# Patient Record
Sex: MALE
[De-identification: names, ages, dates, MRNs, and addresses within clinical notes are randomized per-mention and may not be internally consistent; named-entity substitution may affect disease eponyms.]

---

## 2018-07-05 LAB
ALBUMIN SERPL-MCNC: 3.8 G/DL (ref 3.5–5)
ALBUMIN/GLOB SERPL: 1.1 {RATIO} (ref 0.8–2)
ALP SERPL-CCNC: 56 IU/L (ref 40–150)
ALT SERPL-CCNC: 52 IU/L (ref 0–55)
ANION GAP SERPL CALC-SCNC: 9.6 MMOL/L (ref 8–16)
BASOPHILS # BLD AUTO: 0 10*3/UL (ref 0–0.1)
BASOPHILS NFR BLD AUTO: 0.5 % (ref 0–1)
BUN SERPL-MCNC: 12 MG/DL (ref 7–26)
BUN/CREAT SERPL: 15 (ref 6–25)
CALCIUM SERPL-MCNC: 9.7 MG/DL (ref 8.4–10.2)
CHLORIDE SERPL-SCNC: 105 MMOL/L (ref 98–107)
CO2 SERPL-SCNC: 31 MMOL/L (ref 22–29)
DEPRECATED APTT PLAS QN: 48.5 SECONDS (ref 23.8–35.5)
DEPRECATED INR PLAS: 2.27
DEPRECATED NEUTROPHILS # BLD AUTO: 3.4 10*3/UL (ref 2.1–6.9)
EGFRCR SERPLBLD CKD-EPI 2021: > 60 ML/MIN (ref 60–?)
EOSINOPHIL # BLD AUTO: 0 10*3/UL (ref 0–0.4)
EOSINOPHIL NFR BLD AUTO: 0.7 % (ref 0–6)
ERYTHROCYTE [DISTWIDTH] IN CORD BLOOD: 13.6 % (ref 11.7–14.4)
GLOBULIN PLAS-MCNC: 3.4 G/DL (ref 2.3–3.5)
GLUCOSE SERPLBLD-MCNC: 111 MG/DL (ref 74–118)
HCT VFR BLD AUTO: 44.6 % (ref 38.2–49.6)
HGB BLD-MCNC: 14.6 G/DL (ref 14–18)
LYMPHOCYTES # BLD: 1.5 10*3/UL (ref 1–3.2)
LYMPHOCYTES NFR BLD AUTO: 26.3 % (ref 18–39.1)
MCH RBC QN AUTO: 29.3 PG (ref 28–32)
MCHC RBC AUTO-ENTMCNC: 32.7 G/DL (ref 31–35)
MCV RBC AUTO: 89.6 FL (ref 81–99)
MONOCYTES # BLD AUTO: 0.8 10*3/UL (ref 0.2–0.8)
MONOCYTES NFR BLD AUTO: 13 % (ref 4.4–11.3)
NEUTS SEG NFR BLD AUTO: 59.3 % (ref 38.7–80)
PLATELET # BLD AUTO: 180 X10E3/UL (ref 140–360)
POTASSIUM SERPL-SCNC: 4.6 MMOL/L (ref 3.5–5.1)
PROTHROMBIN TIME: 23.5 SECONDS (ref 11.9–14.5)
RBC # BLD AUTO: 4.98 X10E6/UL (ref 4.3–5.7)
SODIUM SERPL-SCNC: 141 MMOL/L (ref 136–145)

## 2018-10-03 NOTE — XMS REPORT
Clinical Summary

 Created on: 10/03/2018



Kurt Lindquist

External Reference #: [id number]

: 1940

Sex: Male



Demographics







 Address  [address] [place]  [address]

 

 Home Phone  [phone]

 

 Preferred Language  Unknown

 

 Marital Status  

 

 Pent[employer] Affiliation  [id number]

 

 Race  White

 

 Ethnic Group  /Latin





Author







 Author  [facility]

 

 Organization  [facility]

 

 Address  Unknown

 

 Phone  Unavailable







Support







 Name  Relationship  Address  Phone

 

 Daisy Livingston  [employer]  Unknown  [phone]







Care Team Providers







 Care Team Member Name  Role  Phone

 

 Cl Shankar MD  PCP  [phone]







Allergies

No Known Allergies



Current Medications







      



  Prescription   Sig.   Disp.   Refills   Start   End Date   Status



      Date  

 

      



  metFORMIN (GLUCOPHAGE)   Take 500 mg by mouth 2       Active



  500 mg tablet   (two) times a day with     



   meals.     

 

      



  carvedilol (COREG) 12.5   Take 12.5 mg by mouth 2       Active



  MG tablet   (two) times a day with     



   meals.     

 

      



  gabapentin (NEURONTIN)   Take 300 mg by mouth 3       Active



  300 mg capsule   (three) times a day.     

 

      



  famotidine (PEPCID) 40 MG   Take 40 mg by mouth       Active



  tablet   daily.     

 

      



  aspirin (ECOTRIN) 81 MG   Take 81 mg by mouth       Active



  enteric coated tablet   daily.     

 

      



  simvastatin (ZOCOR) 40 MG   Take 40 mg by mouth       Active



  tablet   nightly.     

 

      



  rivaroxaban (XARELTO) 15   Take 15 mg by mouth.       Active



  mg tablet      

 

      



  pantoprazole (PROTONIX)   Take 40 mg by mouth       Active



  40 MG EC tablet   daily.     

 

      



  sucralfate (CARAFATE) 1   Take 1 g by mouth 4       Active



  gram tablet   (four) times a day.     

 

      



  traMADol (ULTRAM) 50 mg   Take 50 mg by mouth every       Active



  tablet   6 (six) hours as needed     



   for moderate pain.     

 

      



  digOXIN (LANOXIN) 125 mcg   Take 125 mcg by mouth       Active



  tablet   daily.     

 

      



  meclizine (ANTIVERT) 12.5   Take 12.5 mg by mouth 3       Active



  mg tablet   (three) times a day as     



   needed for dizziness.     







Active Problems







 



  Problem   Noted Date

 

 



  Acute hepatitis   2017

 

 



  Hypertension   2017

 

 



  Mixed hyperlipidemia   2017

 

 



  A-fib (HCC)   2017







Encounters







    



  Date   Type   Specialty   Care Team   Description

 

    



  2017   Office Visit   Hepatology   Temo Chong MD   Acute 
hepatitis (Primary



     Aide Blakely, PAC   Dx);



      Hypertension, unspecified



      type;



      Mixed hyperlipidemia;



      Atrial fibrillation,



      unspecified type

 

    



  10/11/2017   Office Visit   Hepatology   Temo Chong MD   Abnormal 
liver enzymes



      (Primary Dx);



      Chronic hepatitis

 

    



  2017   Hospital   Radiology   Temo Chong MD   Abnormal liver 
enzymes



   Encounter   

 

    



  2017   Office Visit   Hepatology   Temo Chong MD   Abnormal 
liver enzymes



      (Primary Dx);



      Cirrhosis of liver



      without ascites,



      unspecified hepatic



      cirrhosis type;



      Mesenteric ischemia;



      Screening for cancer



after 2017



Social History







    



  Tobacco Use   Types   Packs/Day   Years Used   Date

 

    



  Never Assessed    









 



  Sex Assigned at Birth   Date Recorded

 

 



  Not on file 







Last Filed Vital Signs







  



  Vital Sign   Reading   Time Taken

 

  



  Blood Pressure   120/57   2017 11:30 AM CST

 

  



  Pulse   73   2017 11:30 AM CST

 

  



  Temperature   36.1   C (97   F)   10/11/2017 11:28 AM CDT

 

  



  Respiratory Rate   18   2017  2:30 PM CDT

 

  



  Oxygen Saturation   98%   2017 11:30 AM CST

 

  



  Inhaled Oxygen   -   -



  Concentration  

 

  



  Weight   67.6 kg (149 lb)   2017 11:30 AM CST

 

  



  Height   182.9 cm (6')   2017 11:30 AM CST

 

  



  Body Mass Index   20.21   2017 11:30 AM CST







Plan of Treatment







   



  Health Maintenance   Due Date   Last Done   Comments

 

   



  DIABETIC FOOT EXAM   1950  

 

   



  DIABETIC RETINAL EYE EXAM   1950  

 

   



  URINE MICROALBUMIN   1950  

 

   



  SHINGRIX VACCINE (#1)   1990  

 

   



  ZOSTER VACCINE   2000  

 

   



  PNEUMOCOCCAL   2005  



  POLYSACCHARIDE VACCINE   



  AGE 65 AND OVER   

 

   



  PNEUMOCOCCAL-13   2005  

 

   



  INFLUENZA VACCINE   2018  







Procedures







    



  Procedure Name   Priority   Date/Time   Associated Diagnosis   Comments

 

    



  CRYOGLOBULIN   Routine   2017   Acute hepatitis   Results for this



    12:02 PM CST   Hypertension, unspecified   procedure are in the



     type   results section.



     Mixed hyperlipidemia 



     Atrial fibrillation, 



     unspecified type 

 

    



  COMPREHENSIVE METABOLIC   Routine   2017   Acute hepatitis   Results 
for this



  PANEL    12:02 PM CST   Hypertension, unspecified   procedure are in the



     type   results section.



     Mixed hyperlipidemia 



     Atrial fibrillation, 



     unspecified type 

 

    



  CBC WITH PLATELET AND   Routine   2017   Acute hepatitis   Results for 
this



  DIFFERENTIAL    12:02 PM CST   Hypertension, unspecified   procedure are in 
the



     type   results section.



     Mixed hyperlipidemia 



     Atrial fibrillation, 



     unspecified type 

 

    



  PROTHROMBIN TIME WITH INR   Routine   10/11/2017   Abnormal liver enzymes   
Results for this



    12:08 PM CDT   Chronic hepatitis   procedure are in the



      results section.

 

    



  GGT   Routine   10/11/2017   Abnormal liver enzymes   Results for this



    12:08 PM CDT   Chronic hepatitis   procedure are in the



      results section.

 

    



  COMPREHENSIVE METABOLIC   Routine   10/11/2017   Abnormal liver enzymes   
Results for this



  PANEL    12:08 PM CDT   Chronic hepatitis   procedure are in the



      results section.

 

    



  ALPHA FETOPROTEIN   Routine   10/11/2017   Abnormal liver enzymes   Results 
for this



    12:08 PM CDT   Chronic hepatitis   procedure are in the



      results section.

 

    



  CBC WITH PLATELET AND   Routine   10/11/2017   Abnormal liver enzymes   
Results for this



  DIFFERENTIAL    12:08 PM CDT   Chronic hepatitis   procedure are in the



      results section.

 

    



  AMMONIA LEVEL   Routine   10/11/2017   Abnormal liver enzymes   Results for 
this



    12:08 PM CDT   Chronic hepatitis   procedure are in the



      results section.

 

    



  POC GLUCOSE   Routine   2017    Results for this



    1:27 PM CDT    procedure are in the



      results section.

 

    



  SURGICAL PATHOLOGY   Routine   2017    Results for this



  REQUEST    1:03 PM CDT    procedure are in the



      results section.

 

    



  IR TRANSJUGULAR LIVER   Routine   2017   Abnormal liver enzymes   
Results for this



  BIOPSY    11:59 AM CDT    procedure are in the



      results section.

 

    



  POC GLUCOSE   Routine   2017    Results for this



    10:31 AM CDT    procedure are in the



      results section.

 

    



  MITOCHONDRIAL ANTIBODY   Routine   2017   Abnormal liver enzymes   
Results for this



  W/REFL TITER    12:23 PM CDT   Cirrhosis of liver   procedure are in the



     without ascites,   results section.



     unspecified hepatic 



     cirrhosis type 



     Mesenteric ischemia 



     Screening for cancer 

 

    



  SATISH SCREEN W IFA W REFLEX   Routine   2017   Abnormal liver enzymes   
Results for this



  TO TITER    12:23 PM CDT   Cirrhosis of liver   procedure are in the



     without ascites,   results section.



     unspecified hepatic 



     cirrhosis type 



     Mesenteric ischemia 



     Screening for cancer 

 

    



  F-ACTIN (SMOOTH MUSCLE)   Routine   2017   Abnormal liver enzymes   
Results for this



  ANTIBODY, IGG    12:23 PM CDT   Cirrhosis of liver   procedure are in the



     without ascites,   results section.



     unspecified hepatic 



     cirrhosis type 



     Mesenteric ischemia 



     Screening for cancer 

 

    



  THYROID STIMULATING   Routine   2017   Abnormal liver enzymes   Results 
for this



  HORMONE    12:23 PM CDT   Cirrhosis of liver   procedure are in the



     without ascites,   results section.



     unspecified hepatic 



     cirrhosis type 



     Mesenteric ischemia 



     Screening for cancer 

 

    



  RPR TITER WITH REFLEX TO   Routine   2017   Abnormal liver enzymes   
Results for this



  CONFIRMATION    12:23 PM CDT   Cirrhosis of liver   procedure are in the



     without ascites,   results section.



     unspecified hepatic 



     cirrhosis type 



     Mesenteric ischemia 



     Screening for cancer 

 

    



  HEPATITIS E VIRUS AB, IGM   Routine   2017   Abnormal liver enzymes   
Results for this



  BY HEVER    12:23 PM CDT   Cirrhosis of liver   procedure are in the



     without ascites,   results section.



     unspecified hepatic 



     cirrhosis type 



     Mesenteric ischemia 



     Screening for cancer 

 

    



  HEPATITIS E VIRUS (HEV)   Routine   2017   Abnormal liver enzymes   
Results for this



  ANTIBODY (IGG)    12:23 PM CDT   Cirrhosis of liver   procedure are in the



     without ascites,   results section.



     unspecified hepatic 



     cirrhosis type 



     Mesenteric ischemia 



     Screening for cancer 

 

    



  HEPATITIS B CORE ANTIBODY   Routine   2017   Abnormal liver enzymes   
Results for this



  TOTAL    12:23 PM CDT   Cirrhosis of liver   procedure are in the



     without ascites,   results section.



     unspecified hepatic 



     cirrhosis type 



     Mesenteric ischemia 



     Screening for cancer 

 

    



  HEMOGLOBIN A1C   Routine   2017   Abnormal liver enzymes   Results for 
this



    12:23 PM CDT   Cirrhosis of liver   procedure are in the



     without ascites,   results section.



     unspecified hepatic 



     cirrhosis type 



     Mesenteric ischemia 



     Screening for cancer 

 

    



  FERRITIN LEVEL   Routine   2017   Abnormal liver enzymes   Results for 
this



    12:23 PM CDT   Cirrhosis of liver   procedure are in the



     without ascites,   results section.



     unspecified hepatic 



     cirrhosis type 



     Mesenteric ischemia 



     Screening for cancer 

 

    



  SEDIMENTATION RATE   Routine   2017   Abnormal liver enzymes   Results 
for this



    12:23 PM CDT   Cirrhosis of liver   procedure are in the



     without ascites,   results section.



     unspecified hepatic 



     cirrhosis type 



     Mesenteric ischemia 



     Screening for cancer 

 

    



  PROTHROMBIN TIME WITH INR   Routine   2017   Abnormal liver enzymes   
Results for this



    12:23 PM CDT   Cirrhosis of liver   procedure are in the



     without ascites,   results section.



     unspecified hepatic 



     cirrhosis type 



     Mesenteric ischemia 



     Screening for cancer 

 

    



  GGT   Routine   2017   Abnormal liver enzymes   Results for this



    12:23 PM CDT   Cirrhosis of liver   procedure are in the



     without ascites,   results section.



     unspecified hepatic 



     cirrhosis type 



     Mesenteric ischemia 



     Screening for cancer 

 

    



  COMPREHENSIVE METABOLIC   Routine   2017   Abnormal liver enzymes   
Results for this



  PANEL    12:23 PM CDT   Cirrhosis of liver   procedure are in the



     without ascites,   results section.



     unspecified hepatic 



     cirrhosis type 



     Mesenteric ischemia 



     Screening for cancer 

 

    



  CBC WITH PLATELET AND   Routine   2017   Abnormal liver enzymes   
Results for this



  DIFFERENTIAL    12:23 PM CDT   Cirrhosis of liver   procedure are in the



     without ascites,   results section.



     unspecified hepatic 



     cirrhosis type 



     Mesenteric ischemia 



     Screening for cancer 

 

    



  BILE ACIDS, TOTAL   Routine   2017   Abnormal liver enzymes   Results 
for this



    12:23 PM CDT   Cirrhosis of liver   procedure are in the



     without ascites,   results section.



     unspecified hepatic 



     cirrhosis type 



     Mesenteric ischemia 



     Screening for cancer 

 

    



  ALPHA FETOPROTEIN   Routine   2017   Abnormal liver enzymes   Results 
for this



    12:23 PM CDT   Cirrhosis of liver   procedure are in the



     without ascites,   results section.



     unspecified hepatic 



     cirrhosis type 



     Mesenteric ischemia 



     Screening for cancer 

 

    



  AMMONIA LEVEL   Routine   2017   Abnormal liver enzymes   Results for 
this



    12:23 PM CDT   Cirrhosis of liver   procedure are in the



     without ascites,   results section.



     unspecified hepatic 



     cirrhosis type 



     Mesenteric ischemia 



     Screening for cancer 

 

    



  ALPHA-1 ANTITRYPSIN   Routine   2017   Abnormal liver enzymes   Results 
for this



  PHENOTYPE    12:23 PM CDT   Cirrhosis of liver   procedure are in the



     without ascites,   results section.



     unspecified hepatic 



     cirrhosis type 



     Mesenteric ischemia 



     Screening for cancer 

 

    



  ALPHA-1 ANTITRYPSIN LEVEL   Routine   2017   Abnormal liver enzymes   
Results for this



    12:23 PM CDT   Cirrhosis of liver   procedure are in the



     without ascites,   results section.



     unspecified hepatic 



     cirrhosis type 



     Mesenteric ischemia 



     Screening for cancer 



after 2017



Results

* Cryoglobulin (2017 12:02 PM)





   



  Component   Value   Ref Range   Performed At

 

   



  Cryoglobulin   NEGATIVE   NEGATIVE   [facility]



     [facility]



     [facility]

 

   



  % Cryocrit   NONE DETECTED   NONE DETECTED %   [facility]



   Comment:    [facility]



   The Cryocrit is primarily    [facility]



   intended for following a  



   patient  



   with previously defined and  



   quantitated cryoglobulins. The  



   cryocrit may consist of  



   cryoglobulins, fibrins,  



   complement,  



   or mixtures of these. If not  



   previously characterized,  



   consider ordering test code  



   34959, Cryoglobulin Screen  



   with  



   Reflex to Cryglobulin Profile,  



   Serum.  



   This test was developed and  



   its analytical performance  



   characteristics have been  



   determined by [facility]  



   [facility]. It has not been  



   cleared or approved by FDA.  



   This assay has been validated  



   pursuant to the CLIA  



   regulations and is used for  



   clinical  



   purposes.  













  Specimen

 





  Blood









 



  Narrative   Performed At

 

 



  FASTING:NO   [facility]



  FASTING: NO 









 Other Results Text

 

 



Performing Organization Information:



Site ID: [id number]



Name: [facility],



Address: [address] [place] [id number]



Director: Alberto Aaron MD,PhD









   



  Performing Organization   Address   City/State/Zipcode   Phone Number

 

   



  [facility]   [address] [place] [address]   [phone]
[phone]



  [facility]    [id number] 





* CBC with platelet and differential (2017 12:02 PM)



Only the most recent of 3 results within the time period is included.





   



  Component   Value   Ref Range   Performed At

 

   



  WBC   7.1   3.8 - 10.8 Thousand/uL   [facility]



     [place]

 

   



  RBC   5.24   4.20 - 5.80 Million/uL   [facility]



     [place]

 

   



  HGB   15.4   13.2 - 17.1 g/dL   [facility]



     [place]

 

   



  HCT   46.2   38.5 - 50.0 %   [facility]



     [place]

 

   



  MCV   88.2   80.0 - 100.0 fL   [facility]



     [place]

 

   



  MCH   29.4   27.0 - 33.0 pg   [facility]



     [place]

 

   



  MCHC   33.3   32.0 - 36.0 g/dL   [facility]



     [place]

 

   



  RDW   12.9   11.0 - 15.0 %   [facility]



     [place]

 

   



  Platelet count   179   140 - 400 Thousand/uL   [facility]



     [place]

 

   



  MPV   11.6   7.5 - 12.5 fL   [facility]



     [place]

 

   



  Neutrophils, absolute   4,601   1,500 - 7,800 cells/uL   [facility]



     [place]

 

   



  Lymphocytes, absolute   1,612   850 - 3,900 cells/uL   [facility]



     [place]

 

   



  Monocytes, absolute   795   200 - 950 cells/uL   [facility]



     [place]

 

   



  Eosinophils, absolute   43   15 - 500 cells/uL   [facility]



     [place]

 

   



  Basophils, absolute   50   0 - 200 cells/uL   [facility]



     [place]

 

   



  Neutrophils   64.8   %   [facility]



     [place]

 

   



  Lymphocytes   22.7   %   [facility]



     [place]

 

   



  Monocytes   11.2   %   [facility]



     [place]

 

   



  Eosinophils   0.6   %   [facility]



     [place]

 

   



  Basophils + RC   0.7   %   [facility]



     [place]













  Specimen

 





  Blood









 



  Narrative   Performed At

 

 



  FASTING:NO   [facility]



  FASTING: NO 









 Other Results Text

 

 



Performing Organization Information:



Site ID: RGA



[facility]



Address: [address] [place] [id number]



Director: Valeri Zamora MD









   



  Performing Organization   Address   City/State/Zipcode   Phone Number

 

   



  [facility]   

 

   



  [facility] [place] [id number] [phone]
[phone]





* Comprehensive metabolic panel (2017 12:02 PM)



Only the most recent of 3 results within the time period is included.





   



  Component   Value   Ref Range   Performed At

 

   



  Glucose   106 (H)   65 - 99 mg/dL   [facility]



   Comment:    [place]



   Fasting  



   reference interval  



   For someone without known  



   diabetes, a glucose value  



   between 100 and 125 mg/dL is  



   consistent with  



   prediabetes and should be  



   confirmed with a  



   follow-up test.  

 

   



  BUN, whole blood   10   7 - 25 mg/dL   [facility]



     [place]

 

   



  Creatinine   0.99   0.70 - 1.18 mg/dL   [facility]



   Comment:    [place]



   For patients >49 years of age,  



   the reference limit  



   for Creatinine is  



   approximately 13% higher for  



   people  



   identified as  



   -American.  

 

   



  EGFR Non-Afr. American   73   > OR=60 mL/min/1.73m2   [facility]

 

   



  EGFR    85   > OR=60 mL/min/1.73m2   [facility]

 

   



  BUN/creatinine ratio   NOT APPLICABLE   6 - 22 (calc)   [facility]

 

   



  Sodium   142   135 - 146 mmol/L   [facility]

 

   



  Potassium   4.7   3.5 - 5.3 mmol/L   [facility]

 

   



  Chloride   104   98 - 110 mmol/L   [facility]

 

   



  CO2   30   20 - 31 mmol/L   [facility]

 

   



  Calcium   10.2   8.6 - 10.3 mg/dL   [facility]

 

   



  Protein   7.2   6.1 - 8.1 g/dL   [facility]

 

   



  Albumin, S   4.5   3.6 - 5.1 g/dL   [facility]

 

   



  Globulin, total   2.7   1.9 - 3.7 g/dL (calc)   [facility]

 

   



  Albumin/globulin ratio   1.7   1.0 - 2.5 (calc)   [facility]

 

   



  Total bilirubin   0.9   0.2 - 1.2 mg/dL   [facility]

 

   



  Alkaline phosphatase   63   40 - 115 U/L   [facility]

 

   



  AST   57 (H)   10 - 35 U/L   [facility]

 

   



  ALT   69 (H)   9 - 46 U/L   [facility]













  Specimen

 





  Blood









 



  Narrative   Performed At

 

 



  FASTING:NO   [facility]



  FASTING: NO 









 Other Results Text

 

 



Performing Organization Information:



Site ID: RGA



Name: [facility]



Address: [address] [place] [phone]



Director: Valeri Zamora MD









   



  Performing Organization   Address   City/State/Zipcode   Phone Number

 

   



  [place] [id number] [phone]
[phone]





* Alpha fetoprotein (10/11/2017 12:08 PM)



Only the most recent of 2 results within the time period is included.





   



  Component   Value   Ref Range   Performed At

 

   



  Alpha fetoprotein   6.7 (H)   <6.1 ng/mL   [facility]



   Comment:    [facility]



   This test was performed using    II



   the Gloria Rocio  



   chemiluminescent method.  



   Values obtained from  



   different assay methods cannot  



   be used  



   interchangeably. AFP levels,  



   regardless of  



   value, should not be  



   interpreted as absolute  



   evidence of the presence or  



   absence of disease.  













  Specimen

 





  Blood









 Other Results Text

 

 



Performing Organization Information:



Site ID: [id number]



[facility]



Address: [address] [place] [address]



Director: Dr. Temo Perez









   



  Performing Organization   Address   City[facility]   Phone Number

 

   



  [facility] [address] [phone]



  [place]   





* Prothrombin time with INR (10/11/2017 12:08 PM)



Only the most recent of 2 results within the time period is included.





   



  Component   Value   Ref Range   Performed At

 

   



  INR   1.3 (H)    [facility]



   Comment:    [place]



   Reference  



   Range  



   0.9-1.1  



   Moderate-intensity Warfarin  



   Therapy 2.0-3.0  



   Higher-intensity Warfarin  



   Therapy     3.0-4.0  

 

   



  Prothrombin time   14.1 (H)   9.0 - 11.5 sec   [facility]



   Comment:    [place]



   For more information on this  



   test, go to:  



   http://education.[employer].com/faq/FWJ659  













  Specimen

 





  Blood









 Other Results Text

 

 



Performing Organization Information:



Site ID: RGA



[facility]: [facility]



Address: [address] [place] [phone]



Director: Valeri Zamora MD









   



  Performing Organization   Address   City/[facility]   Phone Number

 

   



  [facility] [address] [address] [phone]
[phone]





* GGT (10/11/2017 12:08 PM)



Only the most recent of 2 results within the time period is included.





   



  Component   Value   Ref Range   Performed At

 

   



  GGT   77 (H)   3 - 70 U/L   [facility]



     [place]













  Specimen

 





  Blood









 Other Results Text

 

 



Performing Organization Information:



Site ID: RGA



[facility]: [facility]



Address: [address] [place] [phone]



Director: Valeri Zamora MD









   



  Performing Organization   Address   City/[facility]/Zipcode   Phone Number

 

   



  [facility] [address] [address] [phone]
[phone]





* Ammonia level (10/11/2017 12:08 PM)



Only the most recent of 2 results within the time period is included.





   



  Component   Value   Ref Range   Performed At

 

   



  Ammonia   53 (H)   < OR=47 umol/L   [facility]



     [place]













  Specimen

 





  Blood









 Other Results Text

 

 



Performing Organization Information:



Site ID: RGA



Name: [facility]



Address: [address] [place], [place] [id number]



Director: Valeri Zamora MD









   



  Performing Organization   Address   City/[facility]/Zipcode   Phone Number

 

   



  [facility]   [address] [place] [address]   [phone]
[phone]





* POC glucose (2017  1:27 PM)



Only the most recent of 2 results within the time period is included.





   



  Component   Value   Ref Range   Performed At

 

   



  POC glucose   84   65 - 99 mg/dL   [facility] DEPARTMENT OF



   Comment:    PATHOLOGY AND



   H Notified RN    GENOMIC MEDICINE



   Meter ID: [id number]  



   : [place]  









   



  Performing Organization   Address   City/State/Zipcode   Phone Number

 

   



  [facility] DEPARTMENT OF   [address] [place] [address] 



  PATHOLOGY AND GENOMIC   



  MEDICINE   





* Surgical pathology request (2017  1:03 PM)





   



  Component   Value   Ref Range   Performed At

 

   



  Case number   [id number]    [facility] DEPARTMENT OF



     PATHOLOGY AND



     GENOMIC MEDICINE

 

   



  Surgical pathology report   See link below for PDF Lab    [facility] DEPARTMENT OF



   Report    PATHOLOGY AND



     GENOMIC MEDICINE









   



  Performing Organization   Address   City/[facility]   Phone Number

 

   



  [facility] [place] [address] 



  PATHOLOGY AND GENOMIC   



  MEDICINE   





* IR Transjugular Liver Biopsy (2017 11:59 AM)





 



  Narrative   Performed At

 

 



  EXAMINATION:    IR TRANSJUGULAR LIVER BIOPSY   [facility]



  CLINICAL HISTORY:    R74.8 Abnormal levels of other serum enzymes, ABNORMAL 



  LIVER FUNCTION TESTS 



  PROCEDURE: 



  Transjugular liver biopsy with portal venous pressure measurements 



  Performing Radiologist: 



  Prabhjot Callejas MD 



  Assistants: 



  None 



  Pre Procedure Diagnosis: 



  R74.8 Abnormal levels of other serum enzymes, ABNORMAL LIVER FUNCTION TESTS 



  Post Procedure Diagnosis: 



  R74.8 Abnormal levels of other serum enzymes, ABNORMAL LIVER FUNCTION TESTS 



  Indication: 



  Elevated liver function tests. Concern for medication-induced hepatitis. 



  Complications: 



  No immediate post procedure complications. 



  IMPRESSION: 



  1.    Technically successful transjugular liver biopsy from the right hepatic 



  vein. 



  2.    Portal venous pressure measurements: 



  Right Atrium: 1 mmHg 



  Free Hepatic: 1 mmHg 



  Wedge Hepatic: 4 mmHg 



  3.     Venogram of the right    hepatic vein shows sluggish flow without 



  stenosis. 



  4.     The right internal jugular vein is patent and compressible on 



  pre-procedure ultrasound. 



  PLAN: 



  The patient will be observed for approximately 3 hours in the recovery area 
to 



  monitor vital signs. If vital signs are stable, the patient will be 
discharged 



  home. 



  ------------------------------------------------------------------------------
-- 



  ------------------------------------------------------------------------------
-- 



  --------------- 



  PROCEDURE SUMMARY: 



  1.    Venous access with ultrasound guidance 



  2.    Right    hepatic venogram 



  3.    Right atrial as well as hepatic/portal venous pressure measurements 



  4.    Transjugular liver biopsy 



  PROCEDURE DETAILS: 



  Pre-procedure: 



  Comparison studies: None 



  Written and informed consent for the procedure and monitored conscious 
sedation 



  was obtained from the patient. 



  Prophylactic antibiotics: None 



  Preparation: The right    neck was prepared and draped using all elements of 



  maximal sterile barrier technique including sterile gloves, sterile gown, 



  catheter, mask, large sterile sheet, sterile ultrasound probe cover, hand 



  hygiene and cutaneous 



  antisepsis using chlorhexidine. 



  Anesthesia/Sedation: 



  Level of anesthesia: Moderate Sedation 



  Medications used: Fentanyl and Versed, 1% lidocaine 



  Anesthesia administration: Pulse oximetry, heart rate, and blood pressure 
were 



  continuously monitored by a radiology nurse and the performing provider. 



  Duration of intraservice face-to-face anesthesia/sedation: 21 minutes 



  Access: 



  Local anesthesia was administered. The right internal jugular vein was 
evaluated 



  with preprocedure ultrasound and noted to be patent. Real-time ultrasound was 



  used to visualize needle entry into the vessel and a permanent image was 
stored. 



  A 0.035 inch 



  Amplatz was advanced into the inferior vena cava and an image was archived. 



  Access technique: 



  5 French micropuncture set 



  Transjugular liver biopsy: 



  A 10 French sheath was advanced over the Amplatz wire and positioned within 
the 



  right atrium. Using a 5 French directional catheter, the right    hepatic 
vein 



  was accessed and a venogram was performed confirming positioning. Free, 
portal 



  venous, and right 



  atrial pressures were then obtained. The sheath was then advanced into the 
right 



  hepatic vein through which a biopsy needle kit was advanced and 4 x 18-gauge 



  core biopsies were obtained. 



  Biopsy equipment: Dextera 



  Biopsy needle gauge: 18    gauge 



  Closure: 



  The catheters and wires were then removed and hemostasis was achieved with 



  manual compression. 



  Access site closure technique: Manual compression 



  Contrast: 



  Contrast agent: Omnipaque 



  Contrast volume: N/A 



  Radiation dose: Total Fluoroscopic dose: Reference air Kerma 52 mGy. 



  Additional details: 



  Additional description of procedure: N/A 



  Additional findings: N/A 



  Equipment details: N/A 



  Estimated blood loss: Less than 10 cc 



  [facility]-[id number] 



  [facility]-[id number] 









 Procedure Note

 

 



Hm Interface, Radiology Results Incoming - 2017  6:30 PM CDT



EXAMINATION:  IR TRANSJUGULAR LIVER BIOPSY



CLINICAL HISTORY:  R74.8 Abnormal levels of other serum enzymes, ABNORMAL LIVER 
FUNCTION TESTS

PROCEDURE:

Transjugular liver biopsy with portal venous pressure measurements



Performing Radiologist:

Prabhjot Callejas MD



Assistants:

None 



Pre Procedure Diagnosis:

R74.8 Abnormal levels of other serum enzymes, ABNORMAL LIVER FUNCTION TESTS



Post Procedure Diagnosis:

R74.8 Abnormal levels of other serum enzymes, ABNORMAL LIVER FUNCTION TESTS



Indication:

Elevated liver function tests. Concern for medication-induced hepatitis.



Complications:

No immediate post procedure complications.



IMPRESSION:

 1.  Technically successful transjugular liver biopsy from the right hepatic 
vein.



 2.  Portal venous pressure measurements:

                      Right Atrium: 1 mmHg

                      Free Hepatic: 1 mmHg

                      Wedge Hepatic: 4 mmHg



 3.   Venogram of the right  hepatic vein shows sluggish flow without stenosis.

 4.   The right internal jugular vein is patent and compressible on pre-
procedure ultrasound.



PLAN:

The patient will be observed for approximately 3 hours in the recovery area to 
monitor vital signs. If vital signs are stable, the patient will be discharged 
home.



 -------------------------------------------------------------------------------
--------------------------------------------------------------------------------
----------------



PROCEDURE SUMMARY:

 1.  Venous access with ultrasound guidance



 2.  Right  hepatic venogram



 3.  Right atrial as well as hepatic/portal venous pressure measurements



 4.  Transjugular liver biopsy



PROCEDURE DETAILS:

Pre-procedure:

Comparison studies: None



Written and informed consent for the procedure and monitored conscious sedation 
was obtained from the patient.



Prophylactic antibiotics: None



Preparation: The right  neck was prepared and draped using all elements of 
maximal sterile barrier technique including sterile gloves, sterile gown, 
catheter, mask, large sterile sheet, sterile ultrasound probe cover, hand 
hygiene and cutaneous 

antisepsis using chlorhexidine.



Anesthesia/Sedation:

Level of anesthesia: Moderate Sedation

Medications used: Fentanyl and Versed, 1% lidocaine



Anesthesia administration: Pulse oximetry, heart rate, and blood pressure were 
continuously monitored by a radiology nurse and the performing provider.



Duration of intraservice face-to-face anesthesia/sedation: 21 minutes



Access:

Local anesthesia was administered. The right internal jugular vein was 
evaluated with preprocedure ultrasound and noted to be patent. Real-time 
ultrasound was used to visualize needle entry into the vessel and a permanent 
image was stored. A 0.035 inch 

Amplatz was advanced into the inferior vena cava and an image was archived.



Access technique:

 5 French micropuncture set



Transjugular liver biopsy:

A 10 French sheath was advanced over the Amplatz wire and positioned within the 
right atrium. Using a 5 French directional catheter, the right  hepatic vein 
was accessed and a venogram was performed confirming positioning. Free, portal 
venous, and right 

atrial pressures were then obtained. The sheath was then advanced into the 
right hepatic vein through which a biopsy needle kit was advanced and 4 x 18-
gauge core biopsies were obtained.



Biopsy equipment: Dextera

Biopsy needle gauge: 18  gauge



Closure:

The catheters and wires were then removed and hemostasis was achieved with 
manual compression.



Access site closure technique: Manual compression





Contrast:

Contrast agent: Omnipaque

Contrast volume: N/A



Radiation dose: Total Fluoroscopic dose: Reference air Kerma 52 mGy.



Additional details:

Additional description of procedure: N/A

Additional findings: N/A

Equipment details: N/A

Estimated blood loss: Less than 10 cc





[facility]-[id number]







[facility]-[id number]











   



  Performing Organization   Address   City/State/Zipcode   Phone Number

 

   



   [facility]   [address] [place] [address] 





* Hepatitis E virus (HEV) antibody (IgG) (2017 12:23 PM)





   



  Component   Value   Ref Range   Performed At

 

   



  Hepatitis E IgG   NOT DETECTED    [facility]



   Comment:  



   REFERENCE RANGE: NOT DETECTED  



   Hepatitis E virus (HEV) is a  



   major cause of  



   enteric non-A hepatitis  



   worldwide. HEV IgG is  



   typically detected within one  



   month after  



   infection, and persists for  



   months to years after  



   recovery. Approximately 20% of  



   the  population  



   is positive for HEV IgG,  



   indicating that HEV  



   exposure is more common than  



   previously thought.  



   This test was developed and  



   its analytical performance  



   characteristics have been  



   determined by [facility]  



   Infectious Disease. It has not  



   been cleared or approved by  



   FDA. This assay has been  



   validated pursuant to the CLIA  



   regulations and is used for  



   clinical purposes.  









 



  Narrative   Performed At

 

 



  FASTING:NO   [facility]









 Other Results Text

 

 



Performing Organization Information:



Site ID: [id number]



Name: [facility]



Address: [address] [place] [address]



Director: Curly Woodruff MD









   



  Performing Organization   Address   City/[facility]   Phone Number

 

   



  [facility]   

 

   



  [facility]   [address] [place] [address]   [phone]
[phone] 





* SATISH SCREEN W IFA W REFLEX TO TITER (2017 12:23 PM)





   



  Component   Value   Ref Range   Performed At

 

   



  SATISH screen   NEGATIVE   NEGATIVE   [facility]



   Comment[facility]    [facility]



   SATISH IFA is a first line screen    II



   for detecting the  



   presence of up to  



   approximately 150  



   autoantibodies in  



   various autoimmune diseases. A  



   negative SATISH IFA result  



   suggests SATISH-associated  



   autoimmune diseases are not  



   present at this time.  



   Visit Physician FAQs for  



   interpretation of all  



   antibodies in the [place],  



   prevalence, and association  



   with diseases at  



   http://education.[employer].com/  



   faq/ZVQ797  













  Specimen

 





  Blood









 



  Narrative   Performed At

 

 



  FASTING:NO   [facility]









 Other Results Text

 

 



Performing Organization Information:



Site ID: IG



Name: [facility]



Address: [address] [place] [address]



Director: Dr. Temo Perez









   



  Performing Organization   Address   City/[facility]/Zipcode   Phone Number

 

   



  [facility] [place] [address] [phone]



  II   





* MITOCHONDRIAL ANTIBODY W/REFL TITER (2017 12:23 PM)





   



  Component   Value   Ref Range   Performed At

 

   



  Mitochondrial Ab   NEGATIVE   NEGATIVE   [facility]



   Comment:    [facility]



   This test was developed and    [facility]



   its analytical performance  



   characteristics have been  



   determined by [facility]  



   [facility]. It has not been  



   cleared or approved by FDA.  



   This assay has been validated  



   pursuant to the CLIA  



   regulations and is used for  



   clinical  



   purposes.  

 

   



  Mitochondrial Ab titer   TNP   titer   [facility]



   Comment:    [facility]



   TNP-Screening test Negative or    [facility]



   Not Detected. Titer not  



   performed.  









 



  Narrative   Performed At

 

 



  FASTING:NO   [facility]









 Other Results Text

 

 



Performing Organization Information:



Site ID: EZ



Name: [facility],



Address: [address] [place] [id number]



Director: Alberto Aaron MD,PhD









   



  Performing Organization   Address   City/[facility]   Phone Number

 

   



  [facility]   [phone]
[phone]



  [facility]    [address] 





* RPR titer with reflex to confirmation (2017 12:23 PM)





   



  Component   Value   Ref Range   Performed At

 

   



  RPR (dx) w/refl titer and   NON-REACTIVE   NON-REACTIVE   [facility]



  confirmatory testing     [place]













  Specimen

 





  Blood









 



  Narrative   Performed At

 

 



  FASTING:NO   [facility]









 Other Results Text

 

 



Performing Organization Information:



Site ID: RGA



[facility]: [facility]



Address: [address] [place] [address]



Director: Valeri Zamora MD









   



  Performing Organization   Address   City/[facility]   Phone Number

 

   



  [place] [address]   [phone]
[phone]





* Hepatitis E virus Ab, IgM by HEVER (2017 12:23 PM)





   



  Component   Value   Ref Range   Performed At

 

   



  Hepatitis E IgM   NOT DETECTED    [facility]



   Comment:  



   REFERENCE RANGE: NOT DETECTED  



   Hepatitis E Virus (HEV) is a  



   major cause of  



   enteric non-A hepatitis  



   worldwide. HEV IgM is  



   typically detected within 2-4  



   weeks after  



   infection, and then persists  



   for about two months  



   This test was developed and  



   its analytical performance  



   characteristics have been  



   determined by [facility]  



   Infectious Disease. It has not  



   been cleared or approved by  



   FDA. This assay has been  



   validated pursuant to the CLIA  



   regulations and is used for  



   clinical purposes.  













  Specimen

 





  Blood









 



  Narrative   Performed At

 

 



  FASTING:NO   [facility]









 Other Results Text

 

 



Performing Organization Information:



Site ID: TXC



Name: [facility]



Address: [address] [place] [phone]



Director: Curly Woodruff MD









   



  Performing Organization   Address   City/State/Zipcode   Phone Number

 

   



  [place]   

 

   



  [facility]   [address] [place] [address]   [phone]
[phone] 





* Alpha-1 antitrypsin phenotype (2017 12:23 PM)





   



  Component   Value   Ref Range   Performed At

 

   



  Alpha-1 antitrypsin   SEE NOTE    [facility]



   Comment:    [facility]



   THIS PATIENT'S    [facility]



   ALPHA-1-ANTITRYPSIN PHENOTYPE  



   IS PI*MM.  



   90% of normal individuals have  



   the MM phenotype, with normal  



   quantitative AAT levels. Many  



   phenotypic patterns have been  



   described, including  



   deficiency states with F, S,  



   Z, or  



   other alleles. As a general  



   estimation, compared to M  



   allele  



   of 100% of normal  



   T-5-Spcwocxnank protein, the S  



   allele  



   produces approximately 60% and  



   the Z allele 20%. For  



   example, an MS phenotype would  



   have about 80% of normal  



   E-2-Rqexgrfages protein level,  



   a 50% contribution from the M  



   allele and 30% from the S  



   allele. A ZZ phenotype would  



   have  



   about 20% of normal levels, a  



   10% contribution from each Z  



   gene. The F allele has normal  



   M-2-Fdcxucyalet levels, but  



   the kinetics of elastase  



   inhibition is not as efficient  



   as  



   an M allele product; F alleles  



   should be considered  



   functionally mildly deficient.  



   Other variants are  



   identifiable by phenotypic  



   analysis. These include CM,  



   DP,  



   EM, GM, IS, LM, M1M2, M3M3,  



   MP, MT, XX, MY, and M1N. I, P,  



   T  



   and null alleles are  



   considered deleterious. C, D,  



   E, G, L,  



   M1, M2, M3, X and Y alleles  



   are generally considered  



   normal  



   variants. The MZ-Chowdary  



   phenotype is a normal variant;  



   care  



   should be taken to avoid  



   confusion with the deficient  



   MZ  



   phenotype.  













  Specimen

 





  Blood









 



  Narrative   Performed At

 

 



  FASTING:NO   [facility]









 Other Results Text

 

 



Performing Organization Information:



Site ID: EZ



Name: [facility],



Address: [address] [place] [address]



Director: Alberto Aaron MD,PhD









   



  Performing Organization   Address   City/[facility]   Phone Number

 

   



  [facility]   [address] [place] [address]   [phone]
[phone]



  [facility]    [address] 





* F-actin (smooth muscle) antibody, IgG (2017 12:23 PM)





   



  Component   Value   Ref Range   Performed At

 

   



  F-actin (smooth muscle)   <20   U   [facility]



  Ab, IgG   Comment:    [facility]



   Reference    [facility]



   Range:  



   <20  



   NEGATIVE  



   > OR=20  



   POSITIVE  



   Antibodies recognizing actin  



   are the main component  



   of smooth muscle antibodies  



   associated with  



   autoimmune liver disease.  



   Actin antibodies are  



   found in approximately 75% of  



   patients with  



   autoimmune hepatitis (AIH)  



   type 1, approximately  



   65% of patients with  



   autoimmune cholangitis,  



   approximately 30% of patients  



   with primary biliary  



   cirrhosis, and approximately  



   2% of healthy people.  



   High values are closely  



   correlated with AIH type 1.  













  Specimen

 





  Blood









 



  Narrative   Performed At

 

 



  FASTING:NO   [facility]









 Other Results Text

 

 



Performing Organization Information:



Site ID: EZ



Name: [facility],



Address: [address] [place] [phone]



Director: Alberto Aaron MD,PhD









   



  Performing Organization   Address   City/[facility]   Phone Number

 

   



  [facility]   [address] [place] [address]   [phone]
[phone]



  [facility]    [address] 





* Alpha-1 antitrypsin level (2017 12:23 PM)





   



  Component   Value   Ref Range   Performed At

 

   



  Alpha-1 antitrypsin   170   83 - 199 mg/dL   [facility]



     II













  Specimen

 





  Blood









 



  Narrative   Performed At

 

 



  FASTING:NO   [facility]









 Other Results Text

 

 



Performing Organization Information:



Site ID: IG



Name: [facility]



Address: [address] [place] [address]



Director: Dr. Temo Perez









   



  Performing Organization   Address   City/[facility]   Phone Number

 

   



  [facility]   [address] [place] [address] [phone]



  II   





* Bile acids, total (2017 12:23 PM)





   



  Component   Value   Ref Range   Performed At

 

   



  Bile acids, total   9   0 - 19 umol/L   [facility]



     [place]













  Specimen

 





  Blood









 



  Narrative   Performed At

 

 



  FASTING:NO   [facility]









 Other Results Text

 

 



Performing Organization Information:



Site ID: AMD



Name: [facility]



Address: [address] [place] [address]



Director: Harlan Bryson M.D.,PhD









   



  Performing Organization   Address   City/State/Zipcode   Phone Number

 

   



  [facility]   

 

   



  [place] [facility]   [address] [place], [place]    [phone]
[phone]



  [place]   





* Hepatitis B core antibody total (2017 12:23 PM)





   



  Component   Value   Ref Range   Performed At

 

   



  Hepatitis B core total Ab   NON-REACTIVE   NON-REACTIVE   [facility]



     [place]













  Specimen

 





  Blood









 



  Narrative   Performed At

 

 



  FASTING:NO   [facility]









 Other Results Text

 

 



Performing Organization Information:



Site ID: RGA



Name: [facility]



Address: [address] [place] [address]



Director: Valeri Zamora MD









   



  Performing Organization   Address   City/State/[facility]   Phone Number

 

   



  [facility] [address] [address] [phone]
[phone]





* Sedimentation rate (2017 12:23 PM)





   



  Component   Value   Ref Range   Performed At

 

   



  Sedimentation rate   6   < OR=20 mm/h   [facility]



     [place]













  Specimen

 





  Blood









 



  Narrative   Performed At

 

 



  FASTING:NO   [facility]









 Other Results Text

 

 



Performing Organization Information:



Site ID: RGA



Name: [facility]



Address: [address] [place] [phone]



Director: Valeri Zamora MD









   



  Performing Organization   Address   City/[facility]   Phone Number

 

   



  [facility] [address] [address] [phone]
[phone]





* Thyroid stimulating hormone (2017 12:23 PM)





   



  Component   Value   Ref Range   Performed At

 

   



  TSH   0.86   0.40 - 4.50 mIU/L   [facility]



     [place]













  Specimen

 





  Blood









 



  Narrative   Performed At

 

 



  FASTING:NO   [facility]









 Other Results Text

 

 



Performing Organization Information:



Site ID: RGA



Name: [facility]



Address: [address] [place] [phone]



Director: Valeri Zamora MD









   



  Performing Organization   Address   City/[facility]   Phone Number

 

   



  [facility] [address] [address] [phone]
[phone]





* Hemoglobin A1c (2017 12:23 PM)





   



  Component   Value   Ref Range   Performed At

 

   



  Hemoglobin A1C   6.2 (H)   <5.7 % of total Hgb   [facility]



   Comment:    [place]



   For someone without known  



   diabetes, a hemoglobin  



   A1c value between 5.7% and  



   6.4% is consistent with  



   prediabetes and should be  



   confirmed with a  



   follow-up test.  



   For someone with known  



   diabetes, a value <7%  



   indicates that their diabetes  



   is well controlled. A1c  



   targets should be  



   individualized based on  



   duration of  



   diabetes, age, comorbid  



   conditions, and other  



   considerations.  



   This assay result is  



   consistent with an increased  



   risk  



   of diabetes.  



   Currently, no consensus exists  



   regarding use of  



   hemoglobin A1c for diagnosis  



   of diabetes for children.  













  Specimen

 





  Blood









 



  Narrative   Performed At

 

 



  FASTING:NO   [facility]









 Other Results Text

 

 



Performing Organization Information:



Site ID: RGA



Name: [facility]



Address: [address] [place] [address]



Director: Valeri Zamora MD









   



  Performing Organization   Address   City/State/[facility]   Phone Number

 

   



  [facility] [address] [address] [phone]
[phone]





* Ferritin level (2017 12:23 PM)





   



  Component   Value   Ref Range   Performed At

 

   



  Ferritin level   36   20 - 380 ng/mL   [facility]



     [place]













  Specimen

 





  Blood









 



  Narrative   Performed At

 

 



  FASTING:NO   [facility]









 Other Results Text

 

 



Performing Organization Information:



Site ID: RGA



Name: [facility]



Address: [address] [place] [address]



Director: Valeri Zamora MD









   



  Performing Organization   Address   City/State/Zipcode   Phone Number

 

   



  [facility] [address] [address] [phone]
[phone]





after 2017



Insurance







     



  Payer   Benefit   Subscriber ID   Type   Phone   Address



   Plan /    



   Group    

 

     



  T[employer]ANPLUS   T[employer]ANPLUS   xxxxxxxxx   O  



   [facility]    









     



  Guarantor Name   Account   Relation to   Date of   Phone   Billing Address



   Type   Patient   Birth  

 

     



  KURT LINDQUIST   Personal/F   Self   1940   Home:   [address] 
[facility]     [phone]   [place] [address]

## 2018-10-03 NOTE — XMS REPORT
Clinical Summary

 Created on: 10/03/2018



Kurt Lindquist

External Reference #: [id number]

: 1940

Sex: Male



Demographics







 Address  [address] [place]  [address]

 

 Home Phone  [phone]

 

 Preferred Language  Unknown

 

 Marital Status  

 

 [employer] Affiliation  1041

 

 Race  White

 

 Ethnic Group  /Latin





Author







 Author  [facility]

 

 Organization  [facility]

 

 Address  Unknown

 

 Phone  Unavailable







Support







 Name  Relationship  Address  Phone

 

 Daisy Livingston  [employer]  Unknown  [phone]







Care Team Providers







 Care Team Member Name  Role  Phone

 

 Cl Shankar MD  PCP  [phone]







Allergies

No Known Allergies



Current Medications







      



  Prescription   Sig.   Disp.   Refills   Start   End Date   Status



      Date  

 

      



  metFORMIN (GLUCOPHAGE)   Take 500 mg by mouth 2       Active



  500 mg tablet   (two) times a day with     



   meals.     

 

      



  carvedilol (COREG) 12.5   Take 12.5 mg by mouth 2       Active



  MG tablet   (two) times a day with     



   meals.     

 

      



  gabapentin (NEURONTIN)   Take 300 mg by mouth 3       Active



  300 mg capsule   (three) times a day.     

 

      



  famotidine (PEPCID) 40 MG   Take 40 mg by mouth       Active



  tablet   daily.     

 

      



  aspirin (ECOTRIN) 81 MG   Take 81 mg by mouth       Active



  enteric coated tablet   daily.     

 

      



  simvastatin (ZOCOR) 40 MG   Take 40 mg by mouth       Active



  tablet   nightly.     

 

      



  rivaroxaban (XARELTO) 15   Take 15 mg by mouth.       Active



  mg tablet      

 

      



  pantoprazole (PROTONIX)   Take 40 mg by mouth       Active



  40 MG EC tablet   daily.     

 

      



  sucralfate (CARAFATE) 1   Take 1 g by mouth 4       Active



  gram tablet   (four) times a day.     

 

      



  traMADol (ULTRAM) 50 mg   Take 50 mg by mouth every       Active



  tablet   6 (six) hours as needed     



   for moderate pain.     

 

      



  digOXIN (LANOXIN) 125 mcg   Take 125 mcg by mouth       Active



  tablet   daily.     

 

      



  meclizine (ANTIVERT) 12.5   Take 12.5 mg by mouth 3       Active



  mg tablet   (three) times a day as     



   needed for dizziness.     







Active Problems







 



  Problem   Noted Date

 

 



  Acute hepatitis   2017

 

 



  Hypertension   2017

 

 



  Mixed hyperlipidemia   2017

 

 



  A-fib (HCC)   2017







Encounters







    



  Date   Type   Specialty   Care Team   Description

 

    



  2017   Office Visit   Hepatology   Temo Chong MD   Acute 
hepatitis (Primary



     Aide Blakely, PAC   Dx);



      Hypertension, unspecified



      type;



      Mixed hyperlipidemia;



      Atrial fibrillation,



      unspecified type

 

    



  10/11/2017   Office Visit   Hepatology   Temo Chong MD   Abnormal 
liver enzymes



      (Primary Dx);



      Chronic hepatitis

 

    



  2017   Hospital   Radiology   Temo Chong MD   Abnormal liver 
enzymes



   Encounter   

 

    



  2017   Office Visit   Hepatology   Temo Chong MD   Abnormal 
liver enzymes



      (Primary Dx);



      Cirrhosis of liver



      without ascites,



      unspecified hepatic



      cirrhosis type;



      Mesenteric ischemia;



      Screening for cancer



after 2017



Social History







    



  Tobacco Use   Types   Packs/Day   Years Used   Date

 

    



  Never Assessed    









 



  Sex Assigned at Birth   Date Recorded

 

 



  Not on file 







Last Filed Vital Signs







  



  Vital Sign   Reading   Time Taken

 

  



  Blood Pressure   120/57   2017 11:30 AM CST

 

  



  Pulse   73   2017 11:30 AM CST

 

  



  Temperature   36.1   C (97   F)   10/11/2017 11:28 AM CDT

 

  



  Respiratory Rate   18   2017  2:30 PM CDT

 

  



  Oxygen Saturation   98%   2017 11:30 AM CST

 

  



  Inhaled Oxygen   -   -



  Concentration  

 

  



  Weight   67.6 kg (149 lb)   2017 11:30 AM CST

 

  



  Height   182.9 cm (6')   2017 11:30 AM CST

 

  



  Body Mass Index   20.21   2017 11:30 AM CST







Plan of Treatment







   



  Health Maintenance   Due Date   Last Done   Comments

 

   



  DIABETIC FOOT EXAM   1950  

 

   



  DIABETIC RETINAL EYE EXAM   1950  

 

   



  URINE MICROALBUMIN   1950  

 

   



  SHINGRIX VACCINE (#1)   1990  

 

   



  ZOSTER VACCINE   2000  

 

   



  PNEUMOCOCCAL   2005  



  POLYSACCHARIDE VACCINE   



  AGE 65 AND OVER   

 

   



  PNEUMOCOCCAL-13   2005  

 

   



  INFLUENZA VACCINE   2018  







Procedures







    



  Procedure Name   Priority   Date/Time   Associated Diagnosis   Comments

 

    



  CRYOGLOBULIN   Routine   2017   Acute hepatitis   Results for this



    12:02 PM CST   Hypertension, unspecified   procedure are in the



     type   results section.



     Mixed hyperlipidemia 



     Atrial fibrillation, 



     unspecified type 

 

    



  COMPREHENSIVE METABOLIC   Routine   2017   Acute hepatitis   Results 
for this



  PANEL    12:02 PM CST   Hypertension, unspecified   procedure are in the



     type   results section.



     Mixed hyperlipidemia 



     Atrial fibrillation, 



     unspecified type 

 

    



  CBC WITH PLATELET AND   Routine   2017   Acute hepatitis   Results for 
this



  DIFFERENTIAL    12:02 PM CST   Hypertension, unspecified   procedure are in 
the



     type   results section.



     Mixed hyperlipidemia 



     Atrial fibrillation, 



     unspecified type 

 

    



  PROTHROMBIN TIME WITH INR   Routine   10/11/2017   Abnormal liver enzymes   
Results for this



    12:08 PM CDT   Chronic hepatitis   procedure are in the



      results section.

 

    



  GGT   Routine   10/11/2017   Abnormal liver enzymes   Results for this



    12:08 PM CDT   Chronic hepatitis   procedure are in the



      results section.

 

    



  COMPREHENSIVE METABOLIC   Routine   10/11/2017   Abnormal liver enzymes   
Results for this



  PANEL    12:08 PM CDT   Chronic hepatitis   procedure are in the



      results section.

 

    



  ALPHA FETOPROTEIN   Routine   10/11/2017   Abnormal liver enzymes   Results 
for this



    12:08 PM CDT   Chronic hepatitis   procedure are in the



      results section.

 

    



  CBC WITH PLATELET AND   Routine   10/11/2017   Abnormal liver enzymes   
Results for this



  DIFFERENTIAL    12:08 PM CDT   Chronic hepatitis   procedure are in the



      results section.

 

    



  AMMONIA LEVEL   Routine   10/11/2017   Abnormal liver enzymes   Results for 
this



    12:08 PM CDT   Chronic hepatitis   procedure are in the



      results section.

 

    



  POC GLUCOSE   Routine   2017    Results for this



    1:27 PM CDT    procedure are in the



      results section.

 

    



  SURGICAL PATHOLOGY   Routine   2017    Results for this



  REQUEST    1:03 PM CDT    procedure are in the



      results section.

 

    



  IR TRANSJUGULAR LIVER   Routine   2017   Abnormal liver enzymes   
Results for this



  BIOPSY    11:59 AM CDT    procedure are in the



      results section.

 

    



  POC GLUCOSE   Routine   2017    Results for this



    10:31 AM CDT    procedure are in the



      results section.

 

    



  MITOCHONDRIAL ANTIBODY   Routine   2017   Abnormal liver enzymes   
Results for this



  W/REFL TITER    12:23 PM CDT   Cirrhosis of liver   procedure are in the



     without ascites,   results section.



     unspecified hepatic 



     cirrhosis type 



     Mesenteric ischemia 



     Screening for cancer 

 

    



  SATISH SCREEN W IFA W REFLEX   Routine   2017   Abnormal liver enzymes   
Results for this



  TO TITER    12:23 PM CDT   Cirrhosis of liver   procedure are in the



     without ascites,   results section.



     unspecified hepatic 



     cirrhosis type 



     Mesenteric ischemia 



     Screening for cancer 

 

    



  F-ACTIN (SMOOTH MUSCLE)   Routine   2017   Abnormal liver enzymes   
Results for this



  ANTIBODY, IGG    12:23 PM CDT   Cirrhosis of liver   procedure are in the



     without ascites,   results section.



     unspecified hepatic 



     cirrhosis type 



     Mesenteric ischemia 



     Screening for cancer 

 

    



  THYROID STIMULATING   Routine   2017   Abnormal liver enzymes   Results 
for this



  HORMONE    12:23 PM CDT   Cirrhosis of liver   procedure are in the



     without ascites,   results section.



     unspecified hepatic 



     cirrhosis type 



     Mesenteric ischemia 



     Screening for cancer 

 

    



  RPR TITER WITH REFLEX TO   Routine   2017   Abnormal liver enzymes   
Results for this



  CONFIRMATION    12:23 PM CDT   Cirrhosis of liver   procedure are in the



     without ascites,   results section.



     unspecified hepatic 



     cirrhosis type 



     Mesenteric ischemia 



     Screening for cancer 

 

    



  HEPATITIS E VIRUS AB, IGM   Routine   2017   Abnormal liver enzymes   
Results for this



  BY HEVER    12:23 PM CDT   Cirrhosis of liver   procedure are in the



     without ascites,   results section.



     unspecified hepatic 



     cirrhosis type 



     Mesenteric ischemia 



     Screening for cancer 

 

    



  HEPATITIS E VIRUS (HEV)   Routine   2017   Abnormal liver enzymes   
Results for this



  ANTIBODY (IGG)    12:23 PM CDT   Cirrhosis of liver   procedure are in the



     without ascites,   results section.



     unspecified hepatic 



     cirrhosis type 



     Mesenteric ischemia 



     Screening for cancer 

 

    



  HEPATITIS B CORE ANTIBODY   Routine   2017   Abnormal liver enzymes   
Results for this



  TOTAL    12:23 PM CDT   Cirrhosis of liver   procedure are in the



     without ascites,   results section.



     unspecified hepatic 



     cirrhosis type 



     Mesenteric ischemia 



     Screening for cancer 

 

    



  HEMOGLOBIN A1C   Routine   2017   Abnormal liver enzymes   Results for 
this



    12:23 PM CDT   Cirrhosis of liver   procedure are in the



     without ascites,   results section.



     unspecified hepatic 



     cirrhosis type 



     Mesenteric ischemia 



     Screening for cancer 

 

    



  FERRITIN LEVEL   Routine   2017   Abnormal liver enzymes   Results for 
this



    12:23 PM CDT   Cirrhosis of liver   procedure are in the



     without ascites,   results section.



     unspecified hepatic 



     cirrhosis type 



     Mesenteric ischemia 



     Screening for cancer 

 

    



  SEDIMENTATION RATE   Routine   2017   Abnormal liver enzymes   Results 
for this



    12:23 PM CDT   Cirrhosis of liver   procedure are in the



     without ascites,   results section.



     unspecified hepatic 



     cirrhosis type 



     Mesenteric ischemia 



     Screening for cancer 

 

    



  PROTHROMBIN TIME WITH INR   Routine   2017   Abnormal liver enzymes   
Results for this



    12:23 PM CDT   Cirrhosis of liver   procedure are in the



     without ascites,   results section.



     unspecified hepatic 



     cirrhosis type 



     Mesenteric ischemia 



     Screening for cancer 

 

    



  GGT   Routine   2017   Abnormal liver enzymes   Results for this



    12:23 PM CDT   Cirrhosis of liver   procedure are in the



     without ascites,   results section.



     unspecified hepatic 



     cirrhosis type 



     Mesenteric ischemia 



     Screening for cancer 

 

    



  COMPREHENSIVE METABOLIC   Routine   2017   Abnormal liver enzymes   
Results for this



  PANEL    12:23 PM CDT   Cirrhosis of liver   procedure are in the



     without ascites,   results section.



     unspecified hepatic 



     cirrhosis type 



     Mesenteric ischemia 



     Screening for cancer 

 

    



  CBC WITH PLATELET AND   Routine   2017   Abnormal liver enzymes   
Results for this



  DIFFERENTIAL    12:23 PM CDT   Cirrhosis of liver   procedure are in the



     without ascites,   results section.



     unspecified hepatic 



     cirrhosis type 



     Mesenteric ischemia 



     Screening for cancer 

 

    



  BILE ACIDS, TOTAL   Routine   2017   Abnormal liver enzymes   Results 
for this



    12:23 PM CDT   Cirrhosis of liver   procedure are in the



     without ascites,   results section.



     unspecified hepatic 



     cirrhosis type 



     Mesenteric ischemia 



     Screening for cancer 

 

    



  ALPHA FETOPROTEIN   Routine   2017   Abnormal liver enzymes   Results 
for this



    12:23 PM CDT   Cirrhosis of liver   procedure are in the



     without ascites,   results section.



     unspecified hepatic 



     cirrhosis type 



     Mesenteric ischemia 



     Screening for cancer 

 

    



  AMMONIA LEVEL   Routine   2017   Abnormal liver enzymes   Results for 
this



    12:23 PM CDT   Cirrhosis of liver   procedure are in the



     without ascites,   results section.



     unspecified hepatic 



     cirrhosis type 



     Mesenteric ischemia 



     Screening for cancer 

 

    



  ALPHA-1 ANTITRYPSIN   Routine   2017   Abnormal liver enzymes   Results 
for this



  PHENOTYPE    12:23 PM CDT   Cirrhosis of liver   procedure are in the



     without ascites,   results section.



     unspecified hepatic 



     cirrhosis type 



     Mesenteric ischemia 



     Screening for cancer 

 

    



  ALPHA-1 ANTITRYPSIN LEVEL   Routine   2017   Abnormal liver enzymes   
Results for this



    12:23 PM CDT   Cirrhosis of liver   procedure are in the



     without ascites,   results section.



     unspecified hepatic 



     cirrhosis type 



     Mesenteric ischemia 



     Screening for cancer 



after 2017



Results

* Cryoglobulin (2017 12:02 PM)





   



  Component   Value   Ref Range   Performed At

 

   



  Cryoglobulin   NEGATIVE   NEGATIVE   [email or web address]



     [facility]



     [facility]

 

   



  % Cryocrit   NONE DETECTED   NONE DETECTED %   [facility]



   Comment:    [facility]



   The Cryocrit is primarily    [facility]



   intended for following a  



   patient  



   with previously defined and  



   quantitated cryoglobulins. The  



   cryocrit may consist of  



   cryoglobulins, fibrins,  



   complement,  



   or mixtures of these. If not  



   previously characterized,  



   consider ordering test code  



   74927, Cryoglobulin Screen  



   with  



   Reflex to Cryglobulin Profile,  



   Serum.  



   This test was developed and  



   its analytical performance  



   characteristics have been  



   determined by [employer]  



   [facility]. It has not been  



   cleared or approved by FDA.  



   This assay has been validated  



   pursuant to the CLIA  



   regulations and is used for  



   clinical  



   purposes.  













  Specimen

 





  Blood









 



  Narrative   Performed At

 

 



  FASTING:NO   [facility]



  FASTING: NO 









 Other Results Text

 

 



Performing Organization Information:



Site ID: [id number]



Name: [employer],



Address: [address] [place] [id number]



Director: Alberto Aaron MD,PhD









   



  Performing Organization   Address   City/State/Zipcode   Phone Number

 

   



  [facility]   [address] [place] [address]   [phone]
[phone]



  [facility]    [id number] 





* CBC with platelet and differential (2017 12:02 PM)



Only the most recent of 3 results within the time period is included.





   



  Component   Value   Ref Range   Performed At

 

   



  WBC   7.1   3.8 - 10.8 Thousand/uL   [facility]



     [place]

 

   



  RBC   5.24   4.20 - 5.80 Million/uL   [facility]



     [place]

 

   



  HGB   15.4   13.2 - 17.1 g/dL   [facility]



     [place]

 

   



  HCT   46.2   38.5 - 50.0 %   [facility]



     [place]

 

   



  MCV   88.2   80.0 - 100.0 fL   [facility]



     [place]

 

   



  MCH   29.4   27.0 - 33.0 pg   [facility]



     [place]

 

   



  MCHC   33.3   32.0 - 36.0 g/dL   [facility]



     [place]

 

   



  RDW   12.9   11.0 - 15.0 %   [facility]



     [place]

 

   



  Platelet count   179   140 - 400 Thousand/uL   [facility]



     [place]

 

   



  MPV   11.6   7.5 - 12.5 fL   [facility]



     [place]

 

   



  Neutrophils, absolute   4,601   1,500 - 7,800 cells/uL   [facility]



     [place]

 

   



  Lymphocytes, absolute   1,612   850 - 3,900 cells/uL   [facility]



     [place]

 

   



  Monocytes, absolute   795   200 - 950 cells/uL   [facility]



     [place]

 

   



  Eosinophils, absolute   43   15 - 500 cells/uL   [facility]



     [place]

 

   



  Basophils, absolute   50   0 - 200 cells/uL   [facility]



     [place]

 

   



  Neutrophils   64.8   %   [facility]



     [place]

 

   



  Lymphocytes   22.7   %   [facility]



     [place]

 

   



  Monocytes   11.2   %   [facility]



     [place]

 

   



  Eosinophils   0.6   %   [facility]



     [place]

 

   



  Basophils + RC   0.7   %   [facility]



     [place]













  Specimen

 





  Blood









 



  Narrative   Performed At

 

 



  FASTING:NO   [facility]



  FASTING: NO 









 Other Results Text

 

 



Performing Organization Information:



Site ID: RGA



Name: [facility]



Address: [address] [place] [id number]



Director: Valeri Zamora MD









   



  Performing Organization   Address   City/State/Zipcode   Phone Number

 

   



  [facility]   

 

   



  [email or web address] [place] [id number] [phone]
[phone]





* Comprehensive metabolic panel (2017 12:02 PM)



Only the most recent of 3 results within the time period is included.





   



  Component   Value   Ref Range   Performed At

 

   



  Glucose   106 (H)   65 - 99 mg/dL   [facility]



   Comment:    [place]



   Fasting  



   reference interval  



   For someone without known  



   diabetes, a glucose value  



   between 100 and 125 mg/dL is  



   consistent with  



   prediabetes and should be  



   confirmed with a  



   follow-up test.  

 

   



  BUN, whole blood   10   7 - 25 mg/dL   [facility]



     [place]

 

   



  Creatinine   0.99   0.70 - 1.18 mg/dL   [email or web address]



   Comment:    [place]



   For patients >49 years of age,  



   the reference limit  



   for Creatinine is  



   approximately 13% higher for  



   people  



   identified as  



   -American.  

 

   



  EGFR Non-Afr. American   73   > OR=60 mL/min/1.73m2   [facility]

 

   



  EGFR    85   > OR=60 mL/min/1.73m2   [facility]

 

   



  BUN/creatinine ratio   NOT APPLICABLE   6 - 22 (calc)   [facility]

 

   



  Sodium   142   135 - 146 mmol/L   [facility]

 

   



  Potassium   4.7   3.5 - 5.3 mmol/L   [facility]

 

   



  Chloride   104   98 - 110 mmol/L   [facility]

 

   



  CO2   30   20 - 31 mmol/L   [facility]

 

   



  Calcium   10.2   8.6 - 10.3 mg/dL   [facility]

 

   



  Protein   7.2   6.1 - 8.1 g/dL   [facility]

 

   



  Albumin, S   4.5   3.6 - 5.1 g/dL   [facility]

 

   



  Globulin, total   2.7   1.9 - 3.7 g/dL (calc)   [facility]

 

   



  Albumin/globulin ratio   1.7   1.0 - 2.5 (calc)   [facility]

 

   



  Total bilirubin   0.9   0.2 - 1.2 mg/dL   [facility]

 

   



  Alkaline phosphatase   63   40 - 115 U/L   [facility]

 

   



  AST   57 (H)   10 - 35 U/L   [facility]

 

   



  ALT   69 (H)   9 - 46 U/L   [facility]













  Specimen

 





  Blood









 



  Narrative   Performed At

 

 



  FASTING:NO   [facility]



  FASTING: NO 









 Other Results Text

 

 



Performing Organization Information:



Site ID: RGA



Name: [facility]



Address: [address] [place] [id number]



Director: Valeri Zamora MD









   



  Performing Organization   Address   City/State/Zipcode   Phone Number

 

   



  [place] [id number] [phone]
[phone]





* Alpha fetoprotein (10/11/2017 12:08 PM)



Only the most recent of 2 results within the time period is included.





   



  Component   Value   Ref Range   Performed At

 

   



  Alpha fetoprotein   6.7 (H)   <6.1 ng/mL   [facility]



   Comment:    [facility]



   This test was performed using    II



   the Gloria Rocio  



   chemiluminescent method.  



   Values obtained from  



   different assay methods cannot  



   be used  



   interchangeably. AFP levels,  



   regardless of  



   value, should not be  



   interpreted as absolute  



   evidence of the presence or  



   absence of disease.  













  Specimen

 





  Blood









 Other Results Text

 

 



Performing Organization Information:



Site ID: [id number]



Name: [facility]



Address: [address] [place] [address]



Director: Dr. Temo Perez









   



  Performing Organization   Address   City/[facility]   Phone Number

 

   



  [employer] [address] [phone]



  [place]   





* Prothrombin time with INR (10/11/2017 12:08 PM)



Only the most recent of 2 results within the time period is included.





   



  Component   Value   Ref Range   Performed At

 

   



  INR   1.3 (H)    [email or web address]



   Comment:    [place]



   Reference  



   Range  



   0.9-1.1  



   Moderate-intensity Warfarin  



   Therapy 2.0-3.0  



   Higher-intensity Warfarin  



   Therapy     3.0-4.0  

 

   



  Prothrombin time   14.1 (H)   9.0 - 11.5 sec   [email or web address]



   Comment:    [place]



   For more information on this  



   test, go to:  



   http://education.[employer]/faq/PYK050  













  Specimen

 





  Blood









 Other Results Text

 

 



Performing Organization Information:



Site ID: RGA



Name: [facility]



Address: [address] [place] [phone]



Director: Valeri Zamora MD









   



  Performing Organization   Address   City/[facility] Cent[facility]ode   Phone Number

 

   



  [employer] [address] [address] [phone]
[phone]





* GGT (10/11/2017 12:08 PM)



Only the most recent of 2 results within the time period is included.





   



  Component   Value   Ref Range   Performed At

 

   



  GGT   77 (H)   3 - 70 U/L   [facility]



     [place]













  Specimen

 





  Blood









 Other Results Text

 

 



Performing Organization Information:



Site ID: RGA



Name: [facility]



Address: [address] [place] [id number]



Director: Valeri Zamora MD









   



  Performing Organization   Address   City/[facility]/Zipcode   Phone Number

 

   



  [facility] [address] [address] [phone]
[phone]





* Ammonia level (10/11/2017 12:08 PM)



Only the most recent of 2 results within the time period is included.





   



  Component   Value   Ref Range   Performed At

 

   



  Ammonia   53 (H)   < OR=47 umol/L   [facility]



     [place]













  Specimen

 





  Blood









 Other Results Text

 

 



Performing Organization Information:



Site ID: RGA



Name: [facility]



Address: [address] [place], [place] [id number]



Director: Valeri Zamora MD









   



  Performing Organization   Address   City/[facility]/Zipcode   Phone Number

 

   



  [facility]   [address] [place] [address]   [phone]
[phone]





* POC glucose (2017  1:27 PM)



Only the most recent of 2 results within the time period is included.





   



  Component   Value   Ref Range   Performed At

 

   



  POC glucose   84   65 - 99 mg/dL   [facility] DEPARTMENT OF



   Comment:    PATHOLOGY AND



   H Notified RN    GENOMIC MEDICINE



   Meter ID: [id number]  



   : [place]  









   



  Performing Organization   Address   City/State/Zipcode   Phone Number

 

   



  [facility] DEPARTMENT OF   [address] [place] [address] 



  PATHOLOGY AND GENOMIC   



  MEDICINE   





* Surgical pathology request (2017  1:03 PM)





   



  Component   Value   Ref Range   Performed At

 

   



  Case number   [id number]    [facility] DEPARTMENT OF



     PATHOLOGY AND



     GENOMIC MEDICINE

 

   



  Surgical pathology report   See link below for PDF Lab    [facility] DEPARTMENT OF



   Report    PATHOLOGY AND



     GENOMIC MEDICINE









   



  Performing Organization   Address   City/[facility]   Phone Number

 

   



  [facility] [place] [address] 



  PATHOLOGY AND GENOMIC   



  MEDICINE   





* IR Transjugular Liver Biopsy (2017 11:59 AM)





 



  Narrative   Performed At

 

 



  EXAMINATION:    IR TRANSJUGULAR LIVER BIOPSY   [facility]



  CLINICAL HISTORY:    R74.8 Abnormal levels of other serum enzymes, ABNORMAL 



  LIVER FUNCTION TESTS 



  PROCEDURE: 



  Transjugular liver biopsy with portal venous pressure measurements 



  Performing Radiologist: 



  Prabhjot Callejas MD 



  Assistants: 



  None 



  Pre Procedure Diagnosis: 



  R74.8 Abnormal levels of other serum enzymes, ABNORMAL LIVER FUNCTION TESTS 



  Post Procedure Diagnosis: 



  R74.8 Abnormal levels of other serum enzymes, ABNORMAL LIVER FUNCTION TESTS 



  Indication: 



  Elevated liver function tests. Concern for medication-induced hepatitis. 



  Complications: 



  No immediate post procedure complications. 



  IMPRESSION: 



  1.    Technically successful transjugular liver biopsy from the right hepatic 



  vein. 



  2.    Portal venous pressure measurements: 



  Right Atrium: 1 mmHg 



  Free Hepatic: 1 mmHg 



  Wedge Hepatic: 4 mmHg 



  3.     Venogram of the right    hepatic vein shows sluggish flow without 



  stenosis. 



  4.     The right internal jugular vein is patent and compressible on 



  pre-procedure ultrasound. 



  PLAN: 



  The patient will be observed for approximately 3 hours in the recovery area 
to 



  monitor vital signs. If vital signs are stable, the patient will be 
discharged 



  home. 



  ------------------------------------------------------------------------------
-- 



  ------------------------------------------------------------------------------
-- 



  --------------- 



  PROCEDURE SUMMARY: 



  1.    Venous access with ultrasound guidance 



  2.    Right    hepatic venogram 



  3.    Right atrial as well as hepatic/portal venous pressure measurements 



  4.    Transjugular liver biopsy 



  PROCEDURE DETAILS: 



  Pre-procedure: 



  Comparison studies: None 



  Written and informed consent for the procedure and monitored conscious 
sedation 



  was obtained from the patient. 



  Prophylactic antibiotics: None 



  Preparation: The right    neck was prepared and draped using all elements of 



  maximal sterile barrier technique including sterile gloves, sterile gown, 



  catheter, mask, large sterile sheet, sterile ultrasound probe cover, hand 



  hygiene and cutaneous 



  antisepsis using chlorhexidine. 



  Anesthesia/Sedation: 



  Level of anesthesia: Moderate Sedation 



  Medications used: Fentanyl and Versed, 1% lidocaine 



  Anesthesia administration: Pulse oximetry, heart rate, and blood pressure 
were 



  continuously monitored by a radiology nurse and the performing provider. 



  Duration of intraservice face-to-face anesthesia/sedation: 21 minutes 



  Access: 



  Local anesthesia was administered. The right internal jugular vein was 
evaluated 



  with preprocedure ultrasound and noted to be patent. Real-time ultrasound was 



  used to visualize needle entry into the vessel and a permanent image was 
stored. 



  A 0.035 inch 



  Amplatz was advanced into the inferior vena cava and an image was archived. 



  Access technique: 



  5 French micropuncture set 



  Transjugular liver biopsy: 



  A 10 French sheath was advanced over the Amplatz wire and positioned within 
the 



  right atrium. Using a 5 French directional catheter, the right    hepatic 
vein 



  was accessed and a venogram was performed confirming positioning. Free, 
portal 



  venous, and right 



  atrial pressures were then obtained. The sheath was then advanced into the 
right 



  hepatic vein through which a biopsy needle kit was advanced and 4 x 18-gauge 



  core biopsies were obtained. 



  Biopsy equipment: Dextera 



  Biopsy needle gauge: 18    gauge 



  Closure: 



  The catheters and wires were then removed and hemostasis was achieved with 



  manual compression. 



  Access site closure technique: Manual compression 



  Contrast: 



  Contrast agent: Omnipaque 



  Contrast volume: N/A 



  Radiation dose: Total Fluoroscopic dose: Reference air Kerma 52 mGy. 



  Additional details: 



  Additional description of procedure: N/A 



  Additional findings: N/A 



  Equipment details: N/A 



  Estimated blood loss: Less than 10 cc 



  [facility]-[id number] 



  [facility]-[id number] 









 Procedure Note

 

 



Hm Interface, Radiology Results Incoming - 2017  6:30 PM CDT



EXAMINATION:  IR TRANSJUGULAR LIVER BIOPSY



CLINICAL HISTORY:  R74.8 Abnormal levels of other serum enzymes, ABNORMAL LIVER 
FUNCTION TESTS

PROCEDURE:

Transjugular liver biopsy with portal venous pressure measurements



Performing Radiologist:

Prabhjot Callejas MD



Assistants:

None 



Pre Procedure Diagnosis:

R74.8 Abnormal levels of other serum enzymes, ABNORMAL LIVER FUNCTION TESTS



Post Procedure Diagnosis:

R74.8 Abnormal levels of other serum enzymes, ABNORMAL LIVER FUNCTION TESTS



Indication:

Elevated liver function tests. Concern for medication-induced hepatitis.



Complications:

No immediate post procedure complications.



IMPRESSION:

 1.  Technically successful transjugular liver biopsy from the right hepatic 
vein.



 2.  Portal venous pressure measurements:

                      Right Atrium: 1 mmHg

                      Free Hepatic: 1 mmHg

                      Wedge Hepatic: 4 mmHg



 3.   Venogram of the right  hepatic vein shows sluggish flow without stenosis.

 4.   The right internal jugular vein is patent and compressible on pre-
procedure ultrasound.



PLAN:

The patient will be observed for approximately 3 hours in the recovery area to 
monitor vital signs. If vital signs are stable, the patient will be discharged 
home.



 -------------------------------------------------------------------------------
--------------------------------------------------------------------------------
----------------



PROCEDURE SUMMARY:

 1.  Venous access with ultrasound guidance



 2.  Right  hepatic venogram



 3.  Right atrial as well as hepatic/portal venous pressure measurements



 4.  Transjugular liver biopsy



PROCEDURE DETAILS:

Pre-procedure:

Comparison studies: None



Written and informed consent for the procedure and monitored conscious sedation 
was obtained from the patient.



Prophylactic antibiotics: None



Preparation: The right  neck was prepared and draped using all elements of 
maximal sterile barrier technique including sterile gloves, sterile gown, 
catheter, mask, large sterile sheet, sterile ultrasound probe cover, hand 
hygiene and cutaneous 

antisepsis using chlorhexidine.



Anesthesia/Sedation:

Level of anesthesia: Moderate Sedation

Medications used: Fentanyl and Versed, 1% lidocaine



Anesthesia administration: Pulse oximetry, heart rate, and blood pressure were 
continuously monitored by a radiology nurse and the performing provider.



Duration of intraservice face-to-face anesthesia/sedation: 21 minutes



Access:

Local anesthesia was administered. The right internal jugular vein was 
evaluated with preprocedure ultrasound and noted to be patent. Real-time 
ultrasound was used to visualize needle entry into the vessel and a permanent 
image was stored. A 0.035 inch 

Amplatz was advanced into the inferior vena cava and an image was archived.



Access technique:

 5 French micropuncture set



Transjugular liver biopsy:

A 10 French sheath was advanced over the Amplatz wire and positioned within the 
right atrium. Using a 5 French directional catheter, the right  hepatic vein 
was accessed and a venogram was performed confirming positioning. Free, portal 
venous, and right 

atrial pressures were then obtained. The sheath was then advanced into the 
right hepatic vein through which a biopsy needle kit was advanced and 4 x 18-
gauge core biopsies were obtained.



Biopsy equipment: Dextera

Biopsy needle gauge: 18  gauge



Closure:

The catheters and wires were then removed and hemostasis was achieved with 
manual compression.



Access site closure technique: Manual compression





Contrast:

Contrast agent: Omnipaque

Contrast volume: N/A



Radiation dose: Total Fluoroscopic dose: Reference air Kerma 52 mGy.



Additional details:

Additional description of procedure: N/A

Additional findings: N/A

Equipment details: N/A

Estimated blood loss: Less than 10 cc





[facility]-[id number]







[facility]-[id number]











   



  Performing Organization   Address   City/State/Zipcode   Phone Number

 

   



   [place]   [address] [place] [address] 





* Hepatitis E virus (HEV) antibody (IgG) (2017 12:23 PM)





   



  Component   Value   Ref Range   Performed At

 

   



  Hepatitis E IgG   NOT DETECTED    [facility]



   Comment:  



   REFERENCE RANGE: NOT DETECTED  



   Hepatitis E virus (HEV) is a  



   major cause of  



   enteric non-A hepatitis  



   worldwide. HEV IgG is  



   typically detected within one  



   month after  



   infection, and persists for  



   months to years after  



   recovery. Approximately 20% of  



   the  population  



   is positive for HEV IgG,  



   indicating that HEV  



   exposure is more common than  



   previously thought.  



   This test was developed and  



   its analytical performance  



   characteristics have been  



   determined by [employer]  



   Infectious Disease. It has not  



   been cleared or approved by  



   FDA. This assay has been  



   validated pursuant to the CLIA  



   regulations and is used for  



   clinical purposes.  









 



  Narrative   Performed At

 

 



  FASTING:NO   [facility]









 Other Results Text

 

 



Performing Organization Information:



Site ID: [id number]



Name: [employer] [facility]



Address: [address] [place] [id number]



Director: Curly Woodruff MD









   



  Performing Organization   Address   City/[facility]   Phone Number

 

   



  [facility]   

 

   



  [facility]   [address] [place] [address]   [phone]
[phone] 





* SATISH SCREEN W IFA W REFLEX TO TITER (2017 12:23 PM)





   



  Component   Value   Ref Range   Performed At

 

   



  SATISH screen   NEGATIVE   NEGATIVE   [facility]



   Comment[facility]    [facility]



   SATISH IFA is a first line screen    II



   for detecting the  



   presence of up to  



   approximately 150  



   autoantibodies in  



   various autoimmune diseases. A  



   negative SATISH IFA result  



   suggests SATISH-associated  



   autoimmune diseases are not  



   present at this time.  



   Visit Physician FAQs for  



   interpretation of all  



   antibodies in the [place],  



   prevalence, and association  



   with diseases at  



   http://education.[employer].com/  



   faq/WNC220  













  Specimen

 





  Blood









 



  Narrative   Performed At

 

 



  FASTING:NO   [facility]









 Other Results Text

 

 



Performing Organization Information:



Site ID: IG



Name: [facility]



Address: [address] [place] [address]



Director: Dr. Temo Perez









   



  Performing Organization   Address   City/[facility]/Zipcode   Phone Number

 

   



  [employer] [place] [address] [phone]



  II   





* MITOCHONDRIAL ANTIBODY W/REFL TITER (2017 12:23 PM)





   



  Component   Value   Ref Range   Performed At

 

   



  Mitochondrial Ab   NEGATIVE   NEGATIVE   [facility]



   Comment:    [employer] [facility]



   This test was developed and    [facility]



   its analytical performance  



   characteristics have been  



   determined by [employer]  



   [facility]. It has not been  



   cleared or approved by FDA.  



   This assay has been validated  



   pursuant to the CLIA  



   regulations and is used for  



   clinical  



   purposes.  

 

   



  Mitochondrial Ab titer   TNP   titer   [facility]



   Comment:    [employer]



   TNP-Screening test Negative or    [facility]



   Not Detected. Titer not  



   performed.  









 



  Narrative   Performed At

 

 



  FASTING:NO   [facility]









 Other Results Text

 

 



Performing Organization Information:



Site ID: EZ



Name: [employer],



Address: [address] [place] [phone]



Director: Alberto Aaron MD,PhD









   



  Performing Organization   Address   City/[facility]   Phone Number

 

   



  [facility] [employer]   [phone]
[phone]



  [facility]    [address] 





* RPR titer with reflex to confirmation (2017 12:23 PM)





   



  Component   Value   Ref Range   Performed At

 

   



  RPR (dx) w/refl titer and   NON-REACTIVE   NON-REACTIVE   [facility]



  confirmatory testing     [place]













  Specimen

 





  Blood









 



  Narrative   Performed At

 

 



  FASTING:NO   [facility]









 Other Results Text

 

 



Performing Organization Information:



Site ID: RGA



Name: [facility]



Address: [address] [place] [phone]



Director: Valeri Zamora MD









   



  Performing Organization   Address   City/[facility] Cent[facility]   Phone Number

 

   



  [place] [address]   [phone]
[phone]





* Hepatitis E virus Ab, IgM by HEVER (2017 12:23 PM)





   



  Component   Value   Ref Range   Performed At

 

   



  Hepatitis E IgM   NOT DETECTED    [facility]



   Comment:  



   REFERENCE RANGE: NOT DETECTED  



   Hepatitis E Virus (HEV) is a  



   major cause of  



   enteric non-A hepatitis  



   worldwide. HEV IgM is  



   typically detected within 2-4  



   weeks after  



   infection, and then persists  



   for about two months  



   This test was developed and  



   its analytical performance  



   characteristics have been  



   determined by [employer]  



   Infectious Disease. It has not  



   been cleared or approved by  



   FDA. This assay has been  



   validated pursuant to the CLIA  



   regulations and is used for  



   clinical purposes.  













  Specimen

 





  Blood









 



  Narrative   Performed At

 

 



  FASTING:NO   [facility]









 Other Results Text

 

 



Performing Organization Information:



Site ID: [id number]



Name: [employer]



Address: [address] [place] [phone]



Director: Curly Woodruff MD









   



  Performing Organization   Address   City/State/Zipcode   Phone Number

 

   



  [place]   

 

   



  [employer]   [address] [place] [address]   [phone]
[phone] 





* Alpha-1 antitrypsin phenotype (2017 12:23 PM)





   



  Component   Value   Ref Range   Performed At

 

   



  Alpha-1 antitrypsin   SEE NOTE    [facility]



   Comment:    [facility]



   THIS PATIENT'S    [facility]



   ALPHA-1-ANTITRYPSIN PHENOTYPE  



   IS PI*MM.  



   90% of normal individuals have  



   the MM phenotype, with normal  



   quantitative AAT levels. Many  



   phenotypic patterns have been  



   described, including  



   deficiency states with F, S,  



   Z, or  



   other alleles. As a general  



   estimation, compared to M  



   allele  



   of 100% of normal  



   R-9-Erwnlpgdxct protein, the S  



   allele  



   produces approximately 60% and  



   the Z allele 20%. For  



   example, an MS phenotype would  



   have about 80% of normal  



   R-4-Qetbwphreko protein level,  



   a 50% contribution from the M  



   allele and 30% from the S  



   allele. A ZZ phenotype would  



   have  



   about 20% of normal levels, a  



   10% contribution from each Z  



   gene. The F allele has normal  



   T-2-Dzqhdbplzaz levels, but  



   the kinetics of elastase  



   inhibition is not as efficient  



   as  



   an M allele product; F alleles  



   should be considered  



   functionally mildly deficient.  



   Other variants are  



   identifiable by phenotypic  



   analysis. These include CM,  



   DP,  



   EM, GM, IS, LM, M1M2, M3M3,  



   MP, MT, XX, MY, and M1N. I, P,  



   T  



   and null alleles are  



   considered deleterious. C, D,  



   E, G, L,  



   M1, M2, M3, X and Y alleles  



   are generally considered  



   normal  



   variants. The MZ-Chowdary  



   phenotype is a normal variant;  



   care  



   should be taken to avoid  



   confusion with the deficient  



   MZ  



   phenotype.  













  Specimen

 





  Blood









 



  Narrative   Performed At

 

 



  FASTING:NO   [facility]









 Other Results Text

 

 



Performing Organization Information:



Site ID: EZ



Name: [employer],



Address: [address] [place] [phone]



Director: Alberto Aaron MD,PhD









   



  Performing Organization   Address   City/[facility] Cent[facility]   Phone Number

 

   



  [employer]   [address] [place] [address]   [phone]
[phone]



  [facility]    [address] 





* F-actin (smooth muscle) antibody, IgG (2017 12:23 PM)





   



  Component   Value   Ref Range   Performed At

 

   



  F-actin (smooth muscle)   <20   U   [facility]



  Ab, IgG   Comment:    [facility]



   Reference    [facility]



   Range:  



   <20  



   NEGATIVE  



   > OR=20  



   POSITIVE  



   Antibodies recognizing actin  



   are the main component  



   of smooth muscle antibodies  



   associated with  



   autoimmune liver disease.  



   Actin antibodies are  



   found in approximately 75% of  



   patients with  



   autoimmune hepatitis (AIH)  



   type 1, approximately  



   65% of patients with  



   autoimmune cholangitis,  



   approximately 30% of patients  



   with primary biliary  



   cirrhosis, and approximately  



   2% of healthy people.  



   High values are closely  



   correlated with AIH type 1.  













  Specimen

 





  Blood









 



  Narrative   Performed At

 

 



  FASTING:NO   [facility]









 Other Results Text

 

 



Performing Organization Information:



Site ID: EZ



Name: [facility],



Address: [address] [place] [phone]



Director: Alberto Aaron MD,PhD









   



  Performing Organization   Address   City/[facility] Cent[facility]   Phone Number

 

   



  [employer]   [address] [place] [address]   [phone]
[phone]



  [facility]    [address] 





* Alpha-1 antitrypsin level (2017 12:23 PM)





   



  Component   Value   Ref Range   Performed At

 

   



  Alpha-1 antitrypsin   170   83 - 199 mg/dL   [facility]



     II













  Specimen

 





  Blood









 



  Narrative   Performed At

 

 



  FASTING:NO   [facility]









 Other Results Text

 

 



Performing Organization Information:



Site ID: IG



Name: [facility]



Address: [address] [place] [address]



Director: Dr. Temo Perez









   



  Performing Organization   Address   City/[facility] Cent[facility]ode   Phone Number

 

   



  [facility]   [address] [place] [address] [phone]



  II   





* Bile acids, total (2017 12:23 PM)





   



  Component   Value   Ref Range   Performed At

 

   



  Bile acids, total   9   0 - 19 umol/L   [facility]



     [place]













  Specimen

 





  Blood









 



  Narrative   Performed At

 

 



  FASTING:NO   [facility]









 Other Results Text

 

 



Performing Organization Information:



Site ID: AMD



Name: [employer]



Address: [address] [place] [address]



Director: Harlan Bryson M.D.,PhD









   



  Performing Organization   Address   City/State/Zipcode   Phone Number

 

   



  [facility]   

 

   



  [place] [facility]   [address] [place], [place]    [phone]
[phone]



  [place]   





* Hepatitis B core antibody total (2017 12:23 PM)





   



  Component   Value   Ref Range   Performed At

 

   



  Hepatitis B core total Ab   NON-REACTIVE   NON-REACTIVE   [facility]



     [place]













  Specimen

 





  Blood









 



  Narrative   Performed At

 

 



  FASTING:NO   [facility]









 Other Results Text

 

 



Performing Organization Information:



Site ID: RGA



Name: [facility]



Address: [address] [place] [phone]



Director: Valeri Zamora MD









   



  Performing Organization   Address   City/State/[facility] Cent[facility]   Phone Number

 

   



  [employer] [address] [address] [phone]
[phone]





* Sedimentation rate (2017 12:23 PM)





   



  Component   Value   Ref Range   Performed At

 

   



  Sedimentation rate   6   < OR=20 mm/h   [facility]



     [place]













  Specimen

 





  Blood









 



  Narrative   Performed At

 

 



  FASTING:NO   [facility]









 Other Results Text

 

 



Performing Organization Information:



Site ID: RGA



Name: [facility]



Address: [address] [place] [phone]



Director: Valeri Zamora MD









   



  Performing Organization   Address   City/[facility] Centercode   Phone Number

 

   



  [employer] [address] [address] [phone]
[phone]





* Thyroid stimulating hormone (2017 12:23 PM)





   



  Component   Value   Ref Range   Performed At

 

   



  TSH   0.86   0.40 - 4.50 mIU/L   [facility]



     [place]













  Specimen

 





  Blood









 



  Narrative   Performed At

 

 



  FASTING:NO   [facility]









 Other Results Text

 

 



Performing Organization Information:



Site ID: RGA



Name: [facility]



Address: [address] [place] [phone]



Director: Valeri Zamora MD









   



  Performing Organization   Address   City/[facility] Cent[facility]   Phone Number

 

   



  [employer] [address] [address] [phone]
[phone]





* Hemoglobin A1c (2017 12:23 PM)





   



  Component   Value   Ref Range   Performed At

 

   



  Hemoglobin A1C   6.2 (H)   <5.7 % of total Hgb   [facility]



   Comment:    [place]



   For someone without known  



   diabetes, a hemoglobin  



   A1c value between 5.7% and  



   6.4% is consistent with  



   prediabetes and should be  



   confirmed with a  



   follow-up test.  



   For someone with known  



   diabetes, a value <7%  



   indicates that their diabetes  



   is well controlled. A1c  



   targets should be  



   individualized based on  



   duration of  



   diabetes, age, comorbid  



   conditions, and other  



   considerations.  



   This assay result is  



   consistent with an increased  



   risk  



   of diabetes.  



   Currently, no consensus exists  



   regarding use of  



   hemoglobin A1c for diagnosis  



   of diabetes for children.  













  Specimen

 





  Blood









 



  Narrative   Performed At

 

 



  FASTING:NO   [facility]









 Other Results Text

 

 



Performing Organization Information:



Site ID: RGA



Name: [facility]



Address: [address] [place] [phone]



Director: Valeri Zamora MD









   



  Performing Organization   Address   City/State/[facility] Cent[facility]ode   Phone Number

 

   



  [employer] [facility] [address] [address] [phone]
[phone]





* Ferritin level (2017 12:23 PM)





   



  Component   Value   Ref Range   Performed At

 

   



  Ferritin level   36   20 - 380 ng/mL   [facility]



     [place]













  Specimen

 





  Blood









 



  Narrative   Performed At

 

 



  FASTING:NO   QUEST









 Other Results Text

 

 



Performing Organization Information:



Site ID: [id number]



Name: [facility]



Address: [address] [place] [id number]



Director: Valeri Zamora MD









   



  Performing Organization   Address   City/State/Zipcode   Phone Number

 

   



  [employer] [address] [address] [phone]
[phone]





after 2017



Insurance







     



  Payer   Benefit   Subscriber ID   Type   Phone   Address



   Plan /    



   Group    

 

     



  TEXANPLUS   TEXANPLUS   xxxxxxxxx   O  



   [facility]    









     



  Guarantor Name   Account   Relation to   Date of   Phone   Billing Address



   Type   Patient   Birth  

 

     



  KURT LINDQUIST   Personal/F   Self   1940   Home:   [address] 
[facility]     [phone]   [place] [address]

## 2019-06-25 LAB
ALBUMIN SERPL-MCNC: 3.9 G/DL (ref 3.5–5)
ALBUMIN/GLOB SERPL: 1.1 {RATIO} (ref 0.8–2)
ALP SERPL-CCNC: 91 IU/L (ref 40–150)
ALT SERPL-CCNC: 475 IU/L (ref 0–55)
ANION GAP SERPL CALC-SCNC: 13.1 MMOL/L (ref 8–16)
BASOPHILS # BLD AUTO: 0.1 10*3/UL (ref 0–0.1)
BASOPHILS NFR BLD AUTO: 0.8 % (ref 0–1)
BUN SERPL-MCNC: 16 MG/DL (ref 7–26)
BUN/CREAT SERPL: 17 (ref 6–25)
CALCIUM SERPL-MCNC: 10.4 MG/DL (ref 8.4–10.2)
CHLORIDE SERPL-SCNC: 104 MMOL/L (ref 98–107)
CO2 SERPL-SCNC: 24 MMOL/L (ref 22–29)
DEPRECATED APTT PLAS QN: 32 SECONDS (ref 23.8–35.5)
DEPRECATED INR PLAS: 1.03
DEPRECATED NEUTROPHILS # BLD AUTO: 5.6 10*3/UL (ref 2.1–6.9)
EGFRCR SERPLBLD CKD-EPI 2021: > 60 ML/MIN (ref 60–?)
EOSINOPHIL # BLD AUTO: 0.1 10*3/UL (ref 0–0.4)
EOSINOPHIL NFR BLD AUTO: 0.9 % (ref 0–6)
ERYTHROCYTE [DISTWIDTH] IN CORD BLOOD: 15 % (ref 11.7–14.4)
GLOBULIN PLAS-MCNC: 3.4 G/DL (ref 2.3–3.5)
GLUCOSE SERPLBLD-MCNC: 108 MG/DL (ref 74–118)
HCT VFR BLD AUTO: 42.8 % (ref 38.2–49.6)
HGB BLD-MCNC: 14.4 G/DL (ref 14–18)
LYMPHOCYTES # BLD: 2.6 10*3/UL (ref 1–3.2)
LYMPHOCYTES NFR BLD AUTO: 26 % (ref 18–39.1)
MCH RBC QN AUTO: 29.9 PG (ref 28–32)
MCHC RBC AUTO-ENTMCNC: 33.6 G/DL (ref 31–35)
MCV RBC AUTO: 88.8 FL (ref 81–99)
MONOCYTES # BLD AUTO: 1.3 10*3/UL (ref 0.2–0.8)
MONOCYTES NFR BLD AUTO: 13.7 % (ref 4.4–11.3)
NEUTS SEG NFR BLD AUTO: 56.8 % (ref 38.7–80)
PLATELET # BLD AUTO: 273 X10E3/UL (ref 140–360)
POTASSIUM SERPL-SCNC: 4.1 MMOL/L (ref 3.5–5.1)
PROTHROMBIN TIME: 14 SECONDS (ref 11.9–14.5)
RBC # BLD AUTO: 4.82 X10E6/UL (ref 4.3–5.7)
SODIUM SERPL-SCNC: 137 MMOL/L (ref 136–145)

## 2019-07-16 ENCOUNTER — HOSPITAL ENCOUNTER (OUTPATIENT)
Dept: HOSPITAL 88 - OR | Age: 79
Discharge: HOME | End: 2019-07-16
Attending: INTERNAL MEDICINE
Payer: COMMERCIAL

## 2019-07-16 VITALS — DIASTOLIC BLOOD PRESSURE: 65 MMHG | SYSTOLIC BLOOD PRESSURE: 124 MMHG

## 2019-07-16 DIAGNOSIS — K21.9: ICD-10-CM

## 2019-07-16 DIAGNOSIS — E11.9: ICD-10-CM

## 2019-07-16 DIAGNOSIS — Z79.02: ICD-10-CM

## 2019-07-16 DIAGNOSIS — I50.9: ICD-10-CM

## 2019-07-16 DIAGNOSIS — Z79.84: ICD-10-CM

## 2019-07-16 DIAGNOSIS — I85.10: ICD-10-CM

## 2019-07-16 DIAGNOSIS — K74.60: Primary | ICD-10-CM

## 2019-07-16 DIAGNOSIS — K44.9: ICD-10-CM

## 2019-07-16 DIAGNOSIS — Z01.812: ICD-10-CM

## 2019-07-16 DIAGNOSIS — Z80.0: ICD-10-CM

## 2019-07-16 DIAGNOSIS — F17.210: ICD-10-CM

## 2019-07-16 DIAGNOSIS — E78.5: ICD-10-CM

## 2019-07-16 DIAGNOSIS — Z01.810: ICD-10-CM

## 2019-07-16 DIAGNOSIS — K29.80: ICD-10-CM

## 2019-07-16 DIAGNOSIS — J44.9: ICD-10-CM

## 2019-07-16 DIAGNOSIS — I11.0: ICD-10-CM

## 2019-07-16 PROCEDURE — 36415 COLL VENOUS BLD VENIPUNCTURE: CPT

## 2019-07-16 PROCEDURE — 85025 COMPLETE CBC W/AUTO DIFF WBC: CPT

## 2019-07-16 PROCEDURE — 93005 ELECTROCARDIOGRAM TRACING: CPT

## 2019-07-16 PROCEDURE — 80053 COMPREHEN METABOLIC PANEL: CPT

## 2019-07-16 PROCEDURE — 82948 REAGENT STRIP/BLOOD GLUCOSE: CPT

## 2019-07-16 PROCEDURE — 43239 EGD BIOPSY SINGLE/MULTIPLE: CPT

## 2019-07-16 PROCEDURE — 85730 THROMBOPLASTIN TIME PARTIAL: CPT

## 2019-07-16 PROCEDURE — 85610 PROTHROMBIN TIME: CPT

## 2019-07-16 NOTE — XMS REPORT
Patient Summary Document

                             Created on: 2019



KURT LINDQUIST

External Reference #: [id number]

: 1940

Sex: Male



Demographics







                          Address                   [address] [place]  [address]

 

                          Home Phone                [phone] WIFE

 

                          Preferred Language        Unknown

 

                          Marital Status            Unknown

 

                          Restorationism Affiliation     Unknown

 

                          Race                      Unknown

 

                                        Additional Race(s)  

 

                          Ethnic Group              Unknown





Author







                          Author                    [facility]

 

                          [facility]

 

                          Address                   Unknown

 

                          Phone                     Unavailable







Support







                Name            Relationship    Address         Phone

 

                    CARMEN KNOX    PRS                 UNKNOWN

[place]  [address]                      [phone]

 

                    LA PANDEY    PRS                 UNKNOWN

[place], [place]  [address] [phone]

 

                    MARIAA KNOX    PRS                 [address] [place]  [address]                      [phone]

 

                    BEST KNOX    PRS                 UNKNOWN

[place]  [address]                      [phone]







Care Team Providers







                    Care Team Member Name    Role                Phone

 

                          Unavailable               Unavailable







Payers







             Payer Name    Policy Type    Policy Number    Effective Date    Expiration Date







Problems

This patient has no known problems.



Allergies, Adverse Reactions, Alerts







          Allergy Name    Allergy Type    Status    Severity    Reaction(s)    Onset Date    Inactive 

Date                      Treating Clinician        Comments

 

        No Known Drug Allergies    DA      Active    U               2014 00:00:00                     







Medications

This patient has no known medications.

## 2019-07-16 NOTE — XMS REPORT
Clinical Summary

                             Created on: 2019



Brandon Gomes

External Reference #: [id number]

: 1940

Sex: Male



Demographics







                          Address                   [address] [place] [address]

 

                          Home Phone                [phone]

 

                          Preferred Language        Unknown

 

                          Marital Status            Unknown

 

                          Yarsani Affiliation     Unknown

 

                          Race                      Unknown

 

                          Ethnic Group              Unknown





Author







                          Author                    [facility]

 

                          Address                   Unknown

 

                          Phone                     Unavailable







Care Team Providers







                    Care Team Member Name    Role                Phone

 

                          PCP                       Unavailable







Allergies

Not on File



Medications

Not on file



Active Problems





Not on file



Social History







                                        Date



                 Tobacco Use     Types           Packs/Day       Years Used 

 

                                         



                                         Never Assessed    









 



                           Sex Assigned at Birth     Date Recorded

 

 



                                         Not on file 









                                        Industry



                           Job Start Date            Occupation 

 

                                        Not on file



                           Not on file               Not on file 









                                        Travel End



                           Travel History            Travel Start 

 





                                         No recent travel history available.







Last Filed Vital Signs

Not on file



Plan of Treatment





Not on file



Results

Not on fileafter 07/15/2018

## 2019-07-16 NOTE — XMS REPORT
Clinical Summary

                             Created on: 2019



Brandon Gomes

External Reference #: [id number]

: 1940

Sex: Male



Demographics







                          Address                   [address] [place]  [address]

 

                          Home Phone                [phone]

 

                          Preferred Language        Unknown

 

                          Marital Status            

 

                          Ep[employer]opalian Affiliation     [id number]

 

                          Race                      White

 

                          Ethnic Group              /Latin





Author







                          Author                    [employer]

 

                          Organization              [employer]

 

                          Address                   Unknown

 

                          Phone                     Unavailable







Support







                Name            Relationship    Address         Phone

 

                Daisy Livingston    [employer]            Unknown         [phone]







Care Team Providers







                    Care Team Member Name    Role                Phone

 

                    Rob Shankar MD    PCP                 [phone]







Allergies

No Known Allergies



Medications







                          End Date                  Status



              Medication     Sig          Dispensed     Refills      Start  



                                         Date  

 

                                                    Active



                     metFORMIN (GLUCOPHAGE)     Take 500 mg         0   



                           500 mg tablet             by mouth 2     



                                         (two) times a     



                                         day with     



                                         meals.     

 

                                                    Active



                     carvedilol (COREG) 12.5     Take 12.5 mg        0   



                           MG tablet                 by mouth 2     



                                         (two) times a     



                                         day with     



                                         meals.     

 

                                                    Active



                     gabapentin (NEURONTIN)     Take 300 mg         0   



                           300 mg capsule            by mouth 3     



                                         (three) times     



                                         a day.     

 

                                                    Active



                     famotidine (PEPCID) 40 MG     Take 40 mg by       0   



                           tablet                    mouth daily.     

 

                                                    Active



                     aspirin (ECOTRIN) 81 MG     Take 81 mg by       0   



                           enteric coated tablet     mouth daily.     

 

                                                    Active



                     simvastatin (ZOCOR) 40 MG     Take 40 mg by       0   



                           tablet                    mouth     



                                         nightly.     

 

                                                    Active



                     rivaroxaban (XARELTO) 15     Take 15 mg by       0   



                           mg tablet                 mouth.     

 

                                                    Active



                     pantoprazole (PROTONIX)     Take 40 mg by       0   



                           40 MG EC tablet           mouth daily.     

 

                                                    Active



                     sucralfate (CARAFATE) 1     Take 1 g by         0   



                           gram tablet               mouth 4     



                                         (four) times     



                                         a day.     

 

                                                    Active



                     traMADol (ULTRAM) 50 mg     Take 50 mg by       0   



                           tablet                    mouth every 6     



                                         (six) hours     



                                         as needed for     



                                         moderate     



                                         pain.     

 

                                                    Active



                     digOXIN (LANOXIN) 125 mcg     Take 125 mcg        0   



                           tablet                    by mouth     



                                         daily.     

 

                                                    Active



                     meclizine (ANTIVERT) 12.5     Take 12.5 mg        0   



                           mg tablet                 by mouth 3     



                                         (three) times     



                                         a day as     



                                         needed for     



                                         dizziness.     







Active Problems







 



                           Problem                   Noted Date

 

 



                           Acute hepatitis           2017

 

 



                           Hypertension              2017

 

 



                           Mixed hyperlipidemia      2017

 

 



                           A-fib                     2017







Social History







                                        Date



                 Tobacco Use     Types           Packs/Day       Years Used 

 

                                         



                                         Never Assessed    









 



                           Sex Assigned at Birth     Date Recorded

 

 



                                         Not on file 









                                        Industry



                           Job Start Date            Occupation 

 

                                        Not on file



                           Not on file               Not on file 









                                        Travel End



                           Travel History            Travel Start 

 





                                         No recent travel history available.







Last Filed Vital Signs

Not on file



Plan of Treatment







   



                 Health Maintenance     Due Date        Last Done       Comments

 

   



                           SHINGLES VACCINES (#1)     1990  

 

   



                           65+ PNEUMOCOCCAL VACCINE     2005  



                                         (1 of 2 - PCV13)   

 

   



                           INFLUENZA VACCINE         2019  







Results

Not on fileafter 07/15/2018



Insurance







                                        Type



            Payer      Benefit     Subscriber ID     Effective     Phone      Address 



                           Plan /                    Dates   



                                         Group     

 

                                        HMO



                 [employer]US       TEXANPLUS       xxxxxxxxx       2017-P   



                           [employer]   









     



            Guarantor Name     Account     Relation to     Date of     Phone      Billing Address



                     Type                Patient             Birth  

 

     



            Brandon Gomes     Personal/F     Self       1940     [phone] 

[facility]               ([place])              [place] [address]







Advance Directives





Patient has advance care planning documents on file. For more information, lamont ca contact:



Martin Temple



[address] [place] [address]

## 2020-08-20 ENCOUNTER — HOSPITAL ENCOUNTER (OUTPATIENT)
Dept: HOSPITAL 88 - DX | Age: 80
End: 2020-08-20
Attending: INTERNAL MEDICINE
Payer: MEDICARE

## 2020-08-20 DIAGNOSIS — Z11.59: ICD-10-CM

## 2020-08-20 DIAGNOSIS — Z01.818: Primary | ICD-10-CM

## 2020-08-20 DIAGNOSIS — K74.60: ICD-10-CM

## 2020-08-20 LAB
ALBUMIN SERPL-MCNC: 3.7 G/DL (ref 3.5–5)
ALBUMIN/GLOB SERPL: 0.9 {RATIO} (ref 0.8–2)
ALP SERPL-CCNC: 65 IU/L (ref 40–150)
ALT SERPL-CCNC: 99 IU/L (ref 0–55)
ANION GAP SERPL CALC-SCNC: 14.5 MMOL/L (ref 8–16)
BASOPHILS # BLD AUTO: 0.1 10*3/UL (ref 0–0.1)
BASOPHILS NFR BLD AUTO: 0.9 % (ref 0–1)
BUN SERPL-MCNC: 14 MG/DL (ref 7–26)
BUN/CREAT SERPL: 14 (ref 6–25)
CALCIUM SERPL-MCNC: 10.4 MG/DL (ref 8.4–10.2)
CHLORIDE SERPL-SCNC: 108 MMOL/L (ref 98–107)
CO2 SERPL-SCNC: 22 MMOL/L (ref 22–29)
DEPRECATED APTT PLAS QN: 45.2 SECONDS (ref 23.8–35.5)
DEPRECATED INR PLAS: 1.99
DEPRECATED NEUTROPHILS # BLD AUTO: 3.4 10*3/UL (ref 2.1–6.9)
EGFRCR SERPLBLD CKD-EPI 2021: > 60 ML/MIN (ref 60–?)
EOSINOPHIL # BLD AUTO: 0.1 10*3/UL (ref 0–0.4)
EOSINOPHIL NFR BLD AUTO: 0.9 % (ref 0–6)
ERYTHROCYTE [DISTWIDTH] IN CORD BLOOD: 14.7 % (ref 11.7–14.4)
GLOBULIN PLAS-MCNC: 4 G/DL (ref 2.3–3.5)
GLUCOSE SERPLBLD-MCNC: 96 MG/DL (ref 74–118)
HCT VFR BLD AUTO: 40.1 % (ref 38.2–49.6)
HGB BLD-MCNC: 13 G/DL (ref 14–18)
LYMPHOCYTES # BLD: 2 10*3/UL (ref 1–3.2)
LYMPHOCYTES NFR BLD AUTO: 30.4 % (ref 18–39.1)
MCH RBC QN AUTO: 29.4 PG (ref 28–32)
MCHC RBC AUTO-ENTMCNC: 32.4 G/DL (ref 31–35)
MCV RBC AUTO: 90.7 FL (ref 81–99)
MONOCYTES # BLD AUTO: 1.1 10*3/UL (ref 0.2–0.8)
MONOCYTES NFR BLD AUTO: 16.4 % (ref 4.4–11.3)
NEUTS SEG NFR BLD AUTO: 51 % (ref 38.7–80)
PLATELET # BLD AUTO: 283 X10E3/UL (ref 140–360)
POTASSIUM SERPL-SCNC: 4.5 MMOL/L (ref 3.5–5.1)
PROTHROMBIN TIME: 23.9 SECONDS (ref 11.9–14.5)
RBC # BLD AUTO: 4.42 X10E6/UL (ref 4.3–5.7)
SODIUM SERPL-SCNC: 140 MMOL/L (ref 136–145)

## 2020-08-20 PROCEDURE — 85610 PROTHROMBIN TIME: CPT

## 2020-08-20 PROCEDURE — 85025 COMPLETE CBC W/AUTO DIFF WBC: CPT

## 2020-08-20 PROCEDURE — 80053 COMPREHEN METABOLIC PANEL: CPT

## 2020-08-20 PROCEDURE — 36415 COLL VENOUS BLD VENIPUNCTURE: CPT

## 2020-08-20 PROCEDURE — 93005 ELECTROCARDIOGRAM TRACING: CPT

## 2020-08-20 PROCEDURE — 85730 THROMBOPLASTIN TIME PARTIAL: CPT

## 2020-08-28 LAB
DEPRECATED APTT PLAS QN: 29.4 SECONDS (ref 23.8–35.5)
DEPRECATED INR PLAS: 0.99
PROTHROMBIN TIME: 13.6 SECONDS (ref 11.9–14.5)

## 2020-09-01 ENCOUNTER — HOSPITAL ENCOUNTER (OUTPATIENT)
Dept: HOSPITAL 88 - ENDO | Age: 80
Discharge: HOME | End: 2020-09-01
Attending: INTERNAL MEDICINE
Payer: MEDICARE

## 2020-09-01 VITALS — DIASTOLIC BLOOD PRESSURE: 79 MMHG | SYSTOLIC BLOOD PRESSURE: 148 MMHG

## 2020-09-01 DIAGNOSIS — Z11.59: ICD-10-CM

## 2020-09-01 DIAGNOSIS — E78.5: ICD-10-CM

## 2020-09-01 DIAGNOSIS — R63.4: ICD-10-CM

## 2020-09-01 DIAGNOSIS — I85.10: ICD-10-CM

## 2020-09-01 DIAGNOSIS — E11.9: ICD-10-CM

## 2020-09-01 DIAGNOSIS — K29.70: ICD-10-CM

## 2020-09-01 DIAGNOSIS — I25.10: ICD-10-CM

## 2020-09-01 DIAGNOSIS — K21.0: ICD-10-CM

## 2020-09-01 DIAGNOSIS — F17.210: ICD-10-CM

## 2020-09-01 DIAGNOSIS — I10: ICD-10-CM

## 2020-09-01 DIAGNOSIS — Z01.812: ICD-10-CM

## 2020-09-01 DIAGNOSIS — M19.90: ICD-10-CM

## 2020-09-01 DIAGNOSIS — Z79.84: ICD-10-CM

## 2020-09-01 DIAGNOSIS — K44.9: ICD-10-CM

## 2020-09-01 DIAGNOSIS — K74.60: Primary | ICD-10-CM

## 2020-09-01 DIAGNOSIS — R10.2: ICD-10-CM

## 2020-09-01 DIAGNOSIS — Z79.02: ICD-10-CM

## 2020-09-01 DIAGNOSIS — Z95.5: ICD-10-CM

## 2020-09-01 PROCEDURE — 36415 COLL VENOUS BLD VENIPUNCTURE: CPT

## 2020-09-01 PROCEDURE — 43239 EGD BIOPSY SINGLE/MULTIPLE: CPT

## 2020-09-01 PROCEDURE — 82948 REAGENT STRIP/BLOOD GLUCOSE: CPT

## 2020-09-01 PROCEDURE — 43235 EGD DIAGNOSTIC BRUSH WASH: CPT

## 2020-09-01 PROCEDURE — 85610 PROTHROMBIN TIME: CPT

## 2020-09-01 PROCEDURE — 85730 THROMBOPLASTIN TIME PARTIAL: CPT
